# Patient Record
Sex: MALE | Race: WHITE | NOT HISPANIC OR LATINO | Employment: OTHER | URBAN - METROPOLITAN AREA
[De-identification: names, ages, dates, MRNs, and addresses within clinical notes are randomized per-mention and may not be internally consistent; named-entity substitution may affect disease eponyms.]

---

## 2023-05-13 ENCOUNTER — HOSPITAL ENCOUNTER (EMERGENCY)
Facility: CLINIC | Age: 78
Discharge: HOME | End: 2023-05-13
Attending: STUDENT IN AN ORGANIZED HEALTH CARE EDUCATION/TRAINING PROGRAM | Admitting: STUDENT IN AN ORGANIZED HEALTH CARE EDUCATION/TRAINING PROGRAM
Payer: MEDICARE

## 2023-05-13 ENCOUNTER — APPOINTMENT (OUTPATIENT)
Dept: CT IMAGING | Facility: CLINIC | Age: 78
End: 2023-05-13
Attending: STUDENT IN AN ORGANIZED HEALTH CARE EDUCATION/TRAINING PROGRAM
Payer: MEDICARE

## 2023-05-13 VITALS
RESPIRATION RATE: 18 BRPM | DIASTOLIC BLOOD PRESSURE: 82 MMHG | HEIGHT: 68 IN | HEART RATE: 69 BPM | TEMPERATURE: 98.1 F | OXYGEN SATURATION: 98 % | BODY MASS INDEX: 27.43 KG/M2 | WEIGHT: 181 LBS | SYSTOLIC BLOOD PRESSURE: 153 MMHG

## 2023-05-13 DIAGNOSIS — N20.0 KIDNEY STONE: ICD-10-CM

## 2023-05-13 PROBLEM — G47.31 CENTRAL SLEEP APNEA: Status: ACTIVE | Noted: 2022-10-24

## 2023-05-13 PROBLEM — N18.30 CKD (CHRONIC KIDNEY DISEASE) STAGE 3, GFR 30-59 ML/MIN (H): Status: ACTIVE | Noted: 2018-12-08

## 2023-05-13 PROBLEM — A41.9 SEPSIS (H): Status: ACTIVE | Noted: 2018-12-07

## 2023-05-13 PROBLEM — Z95.1 HISTORY OF CORONARY ARTERY BYPASS GRAFT: Status: ACTIVE | Noted: 2022-05-31

## 2023-05-13 LAB
ALBUMIN UR-MCNC: 50 MG/DL
ANION GAP SERPL CALCULATED.3IONS-SCNC: 10 MMOL/L (ref 5–18)
APPEARANCE UR: CLEAR
BACTERIA #/AREA URNS HPF: ABNORMAL /HPF
BASOPHILS # BLD AUTO: 0 10E3/UL (ref 0–0.2)
BASOPHILS NFR BLD AUTO: 0 %
BILIRUB UR QL STRIP: NEGATIVE
BUN SERPL-MCNC: 27 MG/DL (ref 8–28)
C REACTIVE PROTEIN LHE: 0.2 MG/DL (ref 0–?)
CALCIUM SERPL-MCNC: 9.7 MG/DL (ref 8.5–10.5)
CHLORIDE BLD-SCNC: 105 MMOL/L (ref 98–107)
CO2 SERPL-SCNC: 24 MMOL/L (ref 22–31)
COLOR UR AUTO: ABNORMAL
CREAT SERPL-MCNC: 1.49 MG/DL (ref 0.7–1.3)
EOSINOPHIL # BLD AUTO: 0.1 10E3/UL (ref 0–0.7)
EOSINOPHIL NFR BLD AUTO: 1 %
ERYTHROCYTE [DISTWIDTH] IN BLOOD BY AUTOMATED COUNT: 12.3 % (ref 10–15)
GFR SERPL CREATININE-BSD FRML MDRD: 47 ML/MIN/1.73M2
GLUCOSE BLD-MCNC: 226 MG/DL (ref 70–125)
GLUCOSE UR STRIP-MCNC: NEGATIVE MG/DL
HCT VFR BLD AUTO: 38.3 % (ref 40–53)
HGB BLD-MCNC: 12.6 G/DL (ref 13.3–17.7)
HGB UR QL STRIP: ABNORMAL
IMM GRANULOCYTES # BLD: 0.1 10E3/UL
IMM GRANULOCYTES NFR BLD: 0 %
KETONES UR STRIP-MCNC: NEGATIVE MG/DL
LEUKOCYTE ESTERASE UR QL STRIP: NEGATIVE
LYMPHOCYTES # BLD AUTO: 1.5 10E3/UL (ref 0.8–5.3)
LYMPHOCYTES NFR BLD AUTO: 11 %
MCH RBC QN AUTO: 31.3 PG (ref 26.5–33)
MCHC RBC AUTO-ENTMCNC: 32.9 G/DL (ref 31.5–36.5)
MCV RBC AUTO: 95 FL (ref 78–100)
MONOCYTES # BLD AUTO: 0.7 10E3/UL (ref 0–1.3)
MONOCYTES NFR BLD AUTO: 5 %
MUCOUS THREADS #/AREA URNS LPF: PRESENT /LPF
NEUTROPHILS # BLD AUTO: 11 10E3/UL (ref 1.6–8.3)
NEUTROPHILS NFR BLD AUTO: 83 %
NITRATE UR QL: NEGATIVE
NRBC # BLD AUTO: 0 10E3/UL
NRBC BLD AUTO-RTO: 0 /100
PH UR STRIP: 5.5 [PH] (ref 5–7)
PLATELET # BLD AUTO: 210 10E3/UL (ref 150–450)
POTASSIUM BLD-SCNC: 4.5 MMOL/L (ref 3.5–5)
RBC # BLD AUTO: 4.03 10E6/UL (ref 4.4–5.9)
RBC URINE: 25 /HPF
SODIUM SERPL-SCNC: 139 MMOL/L (ref 136–145)
SP GR UR STRIP: 1.02 (ref 1–1.03)
SQUAMOUS EPITHELIAL: 1 /HPF
UROBILINOGEN UR STRIP-MCNC: <2 MG/DL
WBC # BLD AUTO: 13.4 10E3/UL (ref 4–11)
WBC URINE: 4 /HPF

## 2023-05-13 PROCEDURE — 85004 AUTOMATED DIFF WBC COUNT: CPT | Performed by: EMERGENCY MEDICINE

## 2023-05-13 PROCEDURE — 96375 TX/PRO/DX INJ NEW DRUG ADDON: CPT

## 2023-05-13 PROCEDURE — 250N000011 HC RX IP 250 OP 636: Performed by: STUDENT IN AN ORGANIZED HEALTH CARE EDUCATION/TRAINING PROGRAM

## 2023-05-13 PROCEDURE — 36415 COLL VENOUS BLD VENIPUNCTURE: CPT | Performed by: EMERGENCY MEDICINE

## 2023-05-13 PROCEDURE — 80048 BASIC METABOLIC PNL TOTAL CA: CPT | Performed by: EMERGENCY MEDICINE

## 2023-05-13 PROCEDURE — G1010 CDSM STANSON: HCPCS

## 2023-05-13 PROCEDURE — 96374 THER/PROPH/DIAG INJ IV PUSH: CPT | Mod: 59

## 2023-05-13 PROCEDURE — 87086 URINE CULTURE/COLONY COUNT: CPT | Performed by: STUDENT IN AN ORGANIZED HEALTH CARE EDUCATION/TRAINING PROGRAM

## 2023-05-13 PROCEDURE — 99285 EMERGENCY DEPT VISIT HI MDM: CPT | Mod: 25

## 2023-05-13 PROCEDURE — 81003 URINALYSIS AUTO W/O SCOPE: CPT | Performed by: EMERGENCY MEDICINE

## 2023-05-13 PROCEDURE — 86140 C-REACTIVE PROTEIN: CPT | Performed by: EMERGENCY MEDICINE

## 2023-05-13 PROCEDURE — 250N000013 HC RX MED GY IP 250 OP 250 PS 637: Performed by: STUDENT IN AN ORGANIZED HEALTH CARE EDUCATION/TRAINING PROGRAM

## 2023-05-13 RX ORDER — IBUPROFEN 200 MG
400 TABLET ORAL EVERY 6 HOURS
Qty: 56 TABLET | Refills: 0 | Status: SHIPPED | OUTPATIENT
Start: 2023-05-13 | End: 2023-05-20

## 2023-05-13 RX ORDER — KETOROLAC TROMETHAMINE 15 MG/ML
15 INJECTION, SOLUTION INTRAMUSCULAR; INTRAVENOUS ONCE
Status: COMPLETED | OUTPATIENT
Start: 2023-05-13 | End: 2023-05-13

## 2023-05-13 RX ORDER — DIMENHYDRINATE 50 MG
50 TABLET ORAL EVERY 6 HOURS PRN
Qty: 28 TABLET | Refills: 0 | Status: SHIPPED | OUTPATIENT
Start: 2023-05-13 | End: 2023-05-20

## 2023-05-13 RX ORDER — DIMENHYDRINATE 50 MG
50 TABLET ORAL AT BEDTIME
Qty: 7 TABLET | Refills: 0 | Status: SHIPPED | OUTPATIENT
Start: 2023-05-13 | End: 2023-05-20

## 2023-05-13 RX ORDER — ONDANSETRON 2 MG/ML
4 INJECTION INTRAMUSCULAR; INTRAVENOUS ONCE
Status: COMPLETED | OUTPATIENT
Start: 2023-05-13 | End: 2023-05-13

## 2023-05-13 RX ORDER — OXYCODONE HYDROCHLORIDE 5 MG/1
5 TABLET ORAL EVERY 4 HOURS PRN
Qty: 6 TABLET | Refills: 0 | Status: SHIPPED | OUTPATIENT
Start: 2023-05-13 | End: 2023-05-17

## 2023-05-13 RX ORDER — IOPAMIDOL 755 MG/ML
75 INJECTION, SOLUTION INTRAVASCULAR ONCE
Status: COMPLETED | OUTPATIENT
Start: 2023-05-13 | End: 2023-05-13

## 2023-05-13 RX ORDER — CEPHALEXIN 500 MG/1
500 CAPSULE ORAL ONCE
Status: COMPLETED | OUTPATIENT
Start: 2023-05-13 | End: 2023-05-13

## 2023-05-13 RX ORDER — ACETAMINOPHEN 500 MG
1000 TABLET ORAL EVERY 6 HOURS
Qty: 56 TABLET | Refills: 0 | Status: SHIPPED | OUTPATIENT
Start: 2023-05-13 | End: 2023-05-20

## 2023-05-13 RX ORDER — CEPHALEXIN 500 MG/1
500 CAPSULE ORAL 4 TIMES DAILY
Qty: 28 CAPSULE | Refills: 0 | Status: SHIPPED | OUTPATIENT
Start: 2023-05-13 | End: 2023-05-20

## 2023-05-13 RX ADMIN — CEPHALEXIN 500 MG: 500 CAPSULE ORAL at 13:00

## 2023-05-13 RX ADMIN — ONDANSETRON 4 MG: 2 INJECTION INTRAMUSCULAR; INTRAVENOUS at 11:52

## 2023-05-13 RX ADMIN — KETOROLAC TROMETHAMINE 15 MG: 15 INJECTION, SOLUTION INTRAMUSCULAR; INTRAVENOUS at 11:53

## 2023-05-13 RX ADMIN — IOPAMIDOL 75 ML: 755 INJECTION, SOLUTION INTRAVENOUS at 11:47

## 2023-05-13 ASSESSMENT — ACTIVITIES OF DAILY LIVING (ADL): ADLS_ACUITY_SCORE: 35

## 2023-05-13 NOTE — ED PROVIDER NOTES
Emergency Department Encounter         FINAL IMPRESSION:    Kidney stone, bacturia      ED COURSE AND MEDICAL DECISION MAKING       ED Course as of 05/13/23 1323   Sat May 13, 2023   1203 Patient is a 78-year-old male history of kidney stones, here with left flank pain began last night.  2 episodes of vomiting.  Unknown whether he has hematuria.  No bowel changes.  No chest pain or trouble breathing.  On arrival his vitals are stable.  He looks well clinically.  States he has increasing left flank pain.  No rash.  Abdomen is otherwise benign.  Heart and lungs normal.  Plan for labs CT reevaluate.    Urinalysis has small amount of bacteria with no overt signs of infection.  Mild white count of 13.  CRP normal.  No fever.   1314 Labs showing mild leukocytosis.  CRP normal.  Unsure if this is patient's baseline creatinine.  Small amount of bacteria with no obvious signs of infection.  Patient sent with Keflex as a conservative measure due to the fact that he has had urine infections in the past.  He seems reliable, feels well now with no systemic symptoms including fevers or chills.  We explicitly discussed return precautions including rigors, nausea vomiting, night sweats, or increased fatigue to return immediately to the ER             11:36 AM I met with the patient to gather history and to perform my initial exam. We discussed plans for the ED course, including diagnostic testing and treatment.   12:42 PM I rechecked on the patient and updated them on any lab or radiology results.   12:55 PM I rechecked on the patient and updated them on any lab or radiology results. I discussed the plan for discharge. I discussed potential symptoms/reasons to return to the ED and all questions were answered. The patient is comfortable with the plan.    Medical Decision Making    History:    Supplemental history from: Documented in chart, if applicable    External Record(s) reviewed: Documented in chart, if applicable.    Work  Up:    Chart documentation includes differential considered and any EKGs or imaging independently interpreted by provider, where specified.    In additional to work up documented, I considered the following work up: Documented in chart, if applicable.    External consultation:    Discussion of management with another provider: Documented in chart, if applicable    Complicating factors:    Care impacted by chronic illness: N/A    Care affected by social determinants of health: N/A    Disposition considerations: Discharge. I prescribed additional prescription strength medication(s) as charted. I considered admission, but discharged patient after significant clinical improvement.                  Critical Care     Performed by: Maurice Chamberlain or    Authorized by: Maurice Chamberlain  Total critical care time:  minutes  Critical care was necessary to treat or prevent imminent or life-threatening deterioration of the following conditions:   Critical care was time spent personally by me on the following activities: development of treatment plan with patient or surrogate, discussions with consultants, examination of patient, evaluation of patient's response to treatment, obtaining history from patient or surrogate, ordering and performing treatments and interventions, ordering and review of laboratory studies, ordering and review of radiographic studies, re-evaluation of patient's condition and monitoring for potential decompensation.  Critical care time was exclusive of separately billable procedures and treating other patients.'    At the conclusion of the encounter I discussed the results of all the tests and the disposition. The questions were answered. The patient or family acknowledged understanding and was agreeable with the care plan.                  MEDICATIONS GIVEN IN THE EMERGENCY DEPARTMENT:  Medications   ketorolac (TORADOL) injection 15 mg (15 mg Intravenous $Given 5/13/23 1155)   ondansetron (ZOFRAN) injection 4 mg (4 mg  Intravenous $Given 5/13/23 1152)   iopamidol (ISOVUE-370) solution 75 mL (75 mLs Intravenous $Given 5/13/23 1147)   cephALEXin (KEFLEX) capsule 500 mg (500 mg Oral $Given 5/13/23 1300)       NEW PRESCRIPTIONS STARTED AT TODAY'S ED VISIT:  Discharge Medication List as of 5/13/2023 12:59 PM      START taking these medications    Details   acetaminophen (TYLENOL) 500 MG tablet Take 2 tablets (1,000 mg) by mouth every 6 hours for 7 days, Disp-56 tablet, R-0, Local Print      cephALEXin (KEFLEX) 500 MG capsule Take 1 capsule (500 mg) by mouth 4 times daily for 7 days, Disp-28 capsule, R-0, Local Print      !! dimenhyDRINATE (DRAMAMINE) 50 MG tablet Take 1 tablet (50 mg) by mouth At Bedtime for 7 days, Disp-7 tablet, R-0, Local Print      !! dimenhyDRINATE (DRAMAMINE) 50 MG tablet Take 1 tablet (50 mg) by mouth every 6 hours as needed for other (kidney stone pain management), Disp-28 tablet, R-0, Local Print      ibuprofen (ADVIL/MOTRIN) 200 MG tablet Take 2 tablets (400 mg) by mouth every 6 hours for 7 days, Disp-56 tablet, R-0, Local Print      oxyCODONE (ROXICODONE) 5 MG tablet Take 1 tablet (5 mg) by mouth every 4 hours as needed for severe pain If pain is not improved with acetaminophen and ibuprofen., Disp-6 tablet, R-0, Local Print       !! - Potential duplicate medications found. Please discuss with provider.          HPI     Patient information obtained from: patient    Use of Interpretor: N/A    Navin Teresa is a 78 year old male with a pertinent history of CKD stage 3, CAD involving CABG, hyperlipidemia, hypertension, NSTEMI, type 2 diabetes mellitus, kidney stones, sepsis, and malignant neoplasm of prostate who presents to this ED via walk in by self for evaluation of left-sided flank pain and vomiting.      Per chart review, patient presented to [facility]rology Clinic on 4/10/2023 for evaluation of lower urinary tract symptom follow up. Patient ran out of tamsulosin and noticed worsening of lower urinary tract  "symptoms, including increased urinary frequency and weaker stream. He ultimately received a courtesy refill and was back on tamsulosin 0.4 mg daily. Plan to continue this.   Per chart review, patient spoke with nurse triage on 5/13/2023 for evaluation of flank pain. Started feeling pain in left kidney all night long. Rated pain as a 5-6/10. Vomited this AM. Has a history of kidney stones.     Patient presents with left-sided flank pain that has been constantly present since last night (5/12). Also reports of 2 episodes of vomiting, once last night and once this morning. He reports he feels \"terrible.\" He feels like he has a kidney stone. He has had kidney stones in the past, but never this bad.     Patient denies chest pain, trouble breathing, fever, blood in urine, urinary issues, night sweats, or nausea. Patient does not report of any other medical concerns or complaints at this time.     Per triage note, patient had a UTI 3 weeks ago.    REVIEW OF SYSTEMS:  Review of Systems   Constitutional: Negative for fever, malaise, or night sweats  HEENT: Negative runny nose, sore throat, ear pain, neck pain  Respiratory: Negative for shortness of breath, cough, congestion  Cardiovascular: Negative for chest pain, leg edema  Gastrointestinal: Negative for abdominal distention, abdominal pain, constipation, nausea (currently), diarrhea. Positive for vomiting.  Genitourinary: Negative for dysuria and hematuria. Positive for left-sided flank pain.   Integument: Negative for rash, skin breakdown  Neurological: Negative for paresthesias, weakness, headache.  Musculoskeletal: Negative for joint pain, joint swelling    All other systems reviewed and are negative.    MEDICAL HISTORY     History reviewed. No pertinent past medical history.    History reviewed. No pertinent surgical history.         acetaminophen (TYLENOL) 500 MG tablet  cephALEXin (KEFLEX) 500 MG capsule  dimenhyDRINATE (DRAMAMINE) 50 MG tablet  dimenhyDRINATE " "(DRAMAMINE) 50 MG tablet  ibuprofen (ADVIL/MOTRIN) 200 MG tablet  oxyCODONE (ROXICODONE) 5 MG tablet            PHYSICAL EXAM     BP (!) 153/82   Pulse 69   Temp 98.1  F (36.7  C) (Temporal)   Resp 18   Ht 1.727 m (5' 8\")   Wt 82.1 kg (181 lb)   SpO2 98%   BMI 27.52 kg/m        PHYSICAL EXAM:     General: Patient appears well, nontoxic, comfortable  HEENT: Moist mucous membranes,  No head trauma.    Cardiovascular: Normal rate, normal rhythm, no extremity edema.  No appreciable murmur.  Respiratory: No signs of respiratory distress, lungs are clear to auscultation bilaterally with no wheezes rhonchi or rales.  Abdominal: Soft, nontender, nondistended, no palpable masses, no guarding, no rebound  Musculoskeletal: Full range of motion of joints, no deformities appreciated.  Neurological: Alert and oriented, grossly neurologically intact.  Psychological: Normal affect and mood.  Integument: No rashes appreciated          RESULTS       Labs Ordered and Resulted from Time of ED Arrival to Time of ED Departure   BASIC METABOLIC PANEL - Abnormal       Result Value    Sodium 139      Potassium 4.5      Chloride 105      Carbon Dioxide (CO2) 24      Anion Gap 10      Urea Nitrogen 27      Creatinine 1.49 (*)     Calcium 9.7      Glucose 226 (*)     GFR Estimate 47 (*)    ROUTINE UA WITH MICROSCOPIC REFLEX TO CULTURE - Abnormal    Color Urine Light Yellow      Appearance Urine Clear      Glucose Urine Negative      Bilirubin Urine Negative      Ketones Urine Negative      Specific Gravity Urine 1.024      Blood Urine 0.2 mg/dL (*)     pH Urine 5.5      Protein Albumin Urine 50 (*)     Urobilinogen Urine <2.0      Nitrite Urine Negative      Leukocyte Esterase Urine Negative      Bacteria Urine Few (*)     Mucus Urine Present (*)     RBC Urine 25 (*)     WBC Urine 4      Squamous Epithelials Urine 1     CBC WITH PLATELETS AND DIFFERENTIAL - Abnormal    WBC Count 13.4 (*)     RBC Count 4.03 (*)     Hemoglobin 12.6 (*)  "    Hematocrit 38.3 (*)     MCV 95      MCH 31.3      MCHC 32.9      RDW 12.3      Platelet Count 210      % Neutrophils 83      % Lymphocytes 11      % Monocytes 5      % Eosinophils 1      % Basophils 0      % Immature Granulocytes 0      NRBCs per 100 WBC 0      Absolute Neutrophils 11.0 (*)     Absolute Lymphocytes 1.5      Absolute Monocytes 0.7      Absolute Eosinophils 0.1      Absolute Basophils 0.0      Absolute Immature Granulocytes 0.1      Absolute NRBCs 0.0     CRP INFLAMMATION - Normal    CRP 0.2     URINE CULTURE       CT Abdomen Pelvis w Contrast   Final Result   IMPRESSION:    Obstructing 0.4 cm calculus at the left ureterovesical junction.                        PROCEDURES:  Procedures:  Procedures       I, Tam Loza am serving as a scribe to document services personally performed by Maurice Chamberlain DO, based on my observations and the provider's statements to me.  I, Maurice Chamberlain DO, attest that Tam Loza is acting in a scribe capacity, has observed my performance of the services and has documented them in accordance with my direction.    Maurice Chamberlain DO  Emergency Medicine  [facility] EMERGENCY ROOM     Maurice Chamberlain DO  05/13/23 1400

## 2023-05-13 NOTE — ED TRIAGE NOTES
Patient has left flank pain since last night. 7/10. UTI 3 weeks ago. Hx. Of kidney stones. Nausea and vomiting.      Triage Assessment     Row Name 05/13/23 1101       Triage Assessment (Adult)    Airway WDL WDL       Respiratory WDL    Respiratory WDL X  SOB with exertion       Skin Circulation/Temperature WDL    Skin Circulation/Temperature WDL WDL       Cardiac WDL    Cardiac WDL WDL       Peripheral/Neurovascular WDL    Peripheral Neurovascular WDL WDL       Cognitive/Neuro/Behavioral WDL    Cognitive/Neuro/Behavioral WDL WDL

## 2023-05-13 NOTE — Clinical Note
Navin Teresa was seen and treated in our emergency department on 5/13/2023.  He may return to work on 05/17/2023.       If you have any questions or concerns, please don't hesitate to call.      Ohl, Maurice Schwartz, DO

## 2023-05-15 LAB — BACTERIA UR CULT: NORMAL

## 2024-02-01 ENCOUNTER — HOSPITAL ENCOUNTER (EMERGENCY)
Facility: CLINIC | Age: 79
Discharge: HOME | DRG: 853 | End: 2024-02-01
Attending: EMERGENCY MEDICINE | Admitting: EMERGENCY MEDICINE
Payer: MEDICARE

## 2024-02-01 ENCOUNTER — APPOINTMENT (OUTPATIENT)
Dept: CT IMAGING | Facility: CLINIC | Age: 79
DRG: 853 | End: 2024-02-01
Attending: EMERGENCY MEDICINE
Payer: MEDICARE

## 2024-02-01 VITALS
RESPIRATION RATE: 16 BRPM | HEART RATE: 59 BPM | SYSTOLIC BLOOD PRESSURE: 134 MMHG | DIASTOLIC BLOOD PRESSURE: 60 MMHG | WEIGHT: 161 LBS | OXYGEN SATURATION: 96 % | BODY MASS INDEX: 24.48 KG/M2 | TEMPERATURE: 98 F

## 2024-02-01 DIAGNOSIS — N20.1 URETEROLITHIASIS: ICD-10-CM

## 2024-02-01 LAB
ALBUMIN UR-MCNC: 50 MG/DL
ANION GAP SERPL CALCULATED.3IONS-SCNC: 9 MMOL/L (ref 7–15)
APPEARANCE UR: CLEAR
BILIRUB UR QL STRIP: NEGATIVE
BUN SERPL-MCNC: 31.3 MG/DL (ref 8–23)
CALCIUM SERPL-MCNC: 10.2 MG/DL (ref 8.8–10.2)
CHLORIDE SERPL-SCNC: 106 MMOL/L (ref 98–107)
COLOR UR AUTO: ABNORMAL
CREAT SERPL-MCNC: 1.46 MG/DL (ref 0.67–1.17)
DEPRECATED HCO3 PLAS-SCNC: 23 MMOL/L (ref 22–29)
EGFRCR SERPLBLD CKD-EPI 2021: 49 ML/MIN/1.73M2
ERYTHROCYTE [DISTWIDTH] IN BLOOD BY AUTOMATED COUNT: 13 % (ref 10–15)
GLUCOSE SERPL-MCNC: 277 MG/DL (ref 70–99)
GLUCOSE UR STRIP-MCNC: NEGATIVE MG/DL
HCT VFR BLD AUTO: 36.6 % (ref 40–53)
HGB BLD-MCNC: 11.8 G/DL (ref 13.3–17.7)
HGB UR QL STRIP: ABNORMAL
KETONES UR STRIP-MCNC: NEGATIVE MG/DL
LEUKOCYTE ESTERASE UR QL STRIP: NEGATIVE
MCH RBC QN AUTO: 31.4 PG (ref 26.5–33)
MCHC RBC AUTO-ENTMCNC: 32.2 G/DL (ref 31.5–36.5)
MCV RBC AUTO: 97 FL (ref 78–100)
MUCOUS THREADS #/AREA URNS LPF: PRESENT /LPF
NITRATE UR QL: NEGATIVE
PH UR STRIP: 5.5 [PH] (ref 5–7)
PLATELET # BLD AUTO: 152 10E3/UL (ref 150–450)
POTASSIUM SERPL-SCNC: 4.7 MMOL/L (ref 3.4–5.3)
RBC # BLD AUTO: 3.76 10E6/UL (ref 4.4–5.9)
RBC URINE: 64 /HPF
SODIUM SERPL-SCNC: 138 MMOL/L (ref 135–145)
SP GR UR STRIP: 1.02 (ref 1–1.03)
SQUAMOUS EPITHELIAL: 2 /HPF
UROBILINOGEN UR STRIP-MCNC: <2 MG/DL
WBC # BLD AUTO: 10.2 10E3/UL (ref 4–11)
WBC URINE: 5 /HPF

## 2024-02-01 PROCEDURE — 80048 BASIC METABOLIC PNL TOTAL CA: CPT | Performed by: EMERGENCY MEDICINE

## 2024-02-01 PROCEDURE — 258N000003 HC RX IP 258 OP 636: Performed by: EMERGENCY MEDICINE

## 2024-02-01 PROCEDURE — 74176 CT ABD & PELVIS W/O CONTRAST: CPT | Mod: MG

## 2024-02-01 PROCEDURE — 81001 URINALYSIS AUTO W/SCOPE: CPT | Performed by: EMERGENCY MEDICINE

## 2024-02-01 PROCEDURE — 99285 EMERGENCY DEPT VISIT HI MDM: CPT | Mod: 25

## 2024-02-01 PROCEDURE — 96361 HYDRATE IV INFUSION ADD-ON: CPT

## 2024-02-01 PROCEDURE — 250N000011 HC RX IP 250 OP 636: Performed by: EMERGENCY MEDICINE

## 2024-02-01 PROCEDURE — 96374 THER/PROPH/DIAG INJ IV PUSH: CPT

## 2024-02-01 PROCEDURE — 85027 COMPLETE CBC AUTOMATED: CPT | Performed by: EMERGENCY MEDICINE

## 2024-02-01 PROCEDURE — 36415 COLL VENOUS BLD VENIPUNCTURE: CPT | Performed by: EMERGENCY MEDICINE

## 2024-02-01 RX ORDER — ACETAMINOPHEN 500 MG
1000 TABLET ORAL EVERY 6 HOURS
Qty: 56 TABLET | Refills: 0 | Status: ON HOLD | OUTPATIENT
Start: 2024-02-01 | End: 2024-02-06

## 2024-02-01 RX ORDER — KETOROLAC TROMETHAMINE 15 MG/ML
15 INJECTION, SOLUTION INTRAMUSCULAR; INTRAVENOUS ONCE
Status: COMPLETED | OUTPATIENT
Start: 2024-02-01 | End: 2024-02-01

## 2024-02-01 RX ORDER — OXYCODONE HYDROCHLORIDE 5 MG/1
5 TABLET ORAL EVERY 4 HOURS PRN
Qty: 12 TABLET | Refills: 0 | Status: ON HOLD | OUTPATIENT
Start: 2024-02-01 | End: 2024-02-06

## 2024-02-01 RX ORDER — DIMENHYDRINATE 50 MG
50 TABLET ORAL EVERY 6 HOURS PRN
Qty: 28 TABLET | Refills: 0 | Status: ON HOLD | OUTPATIENT
Start: 2024-02-01 | End: 2024-02-06

## 2024-02-01 RX ORDER — ONDANSETRON 4 MG/1
4 TABLET, ORALLY DISINTEGRATING ORAL EVERY 6 HOURS PRN
Qty: 12 TABLET | Refills: 0 | Status: ON HOLD | OUTPATIENT
Start: 2024-02-01 | End: 2024-02-06

## 2024-02-01 RX ADMIN — KETOROLAC TROMETHAMINE 15 MG: 15 INJECTION, SOLUTION INTRAMUSCULAR; INTRAVENOUS at 15:50

## 2024-02-01 RX ADMIN — SODIUM CHLORIDE 500 ML: 9 INJECTION, SOLUTION INTRAVENOUS at 15:54

## 2024-02-01 ASSESSMENT — ACTIVITIES OF DAILY LIVING (ADL): ADLS_ACUITY_SCORE: 35

## 2024-02-01 NOTE — ED PROVIDER NOTES
EMERGENCY DEPARTMENT ENCOUNTER      NAME: Navin Teresa  AGE: 78 year old male  YOB: 1945  MRN: [id number]  EVALUATION DATE & TIME: No admission date for patient encounter.    PCP: Paolo Joyce    ED PROVIDER: Navin Diamond D.O.      Chief Complaint   Patient presents with    Flank Pain       FINAL IMPRESSION:  1. Ureterolithiasis        ED COURSE & MEDICAL DECISION MAKING:    3:47 PM I met with the patient to gather history and to perform my initial exam. I discussed the plan for care while in the Emergency Department.  5:38 PM Spoke to Dr. Hernandez, from [facility]. Recommend patient go home. Patient will receive call tomorrow for a virtual visit for an evaluation and follow up. Likely intervention next week.  5:41 PM Rechecked and updated patient on lab and imaging results and [facility] recommendations.       Pertinent Labs & Imaging studies reviewed. (See chart for details)  78 year old male presents to the Emergency Department for evaluation of left flank pain.  Initial differential does include urolithiasis, pyelonephritis, diverticulitis, colitis, musculoskeletal etiology, other acute process.  Lab testing did show significant blood in his urine, but no evidence of infection making pyelonephritis unlikely.  CT imaging does not show any evidence of inflammation of the bowel, but does show obvious left-sided urolithiasis, 8 mm in size.  I consulted with [facility] due to the severity of the size, and as his pain is well-controlled, they did recommend outpatient follow-up in their clinic.  Patient was comfortable with this plan.  He will be discharged symptomatic control.  Return precautions were discussed.    Medical Decision Making  Obtained supplemental history:Supplemental history obtained?: Documented in chart  Reviewed external records: External records reviewed?: Documented in chart  Care impacted by chronic illness:Cancer/Chemotherapy, Chronic Kidney Disease, Diabetes, Heart Disease, Hyperlipidemia, and  Hypertension  Care significantly affected by social determinants of health:N/A  Did you consider but not order tests?: Work up considered but not performed and documented in chart, if applicable  Did you interpret images independently?: Independent interpretation of ECG and images noted in documentation, when applicable.  Consultation discussion with other provider:Did you involve another provider (consultant, , pharmacy, etc.)?: I discussed the care with another health care provider, see documentation for details.  Discharge. I prescribed additional prescription strength medication(s) as charted. I considered admission, but discharged patient after significant clinical improvement.    At the conclusion of the encounter I discussed the results of all of the tests and the disposition. The questions were answered. The patient or family acknowledged understanding and was agreeable with the care plan.        HPI    Patient information was obtained from: Patient    Use of : N/A       Navin Teresa is a 78 year old male with PMHx of kidney stones, DM, stage 3 CKD, prostate cancer, CAD, dyslipidemia, HLD, and HTN, who presents with left flank pain.    Patient reports 6 months ago, he had left flank pain, was evaluated, and was found to have a 4 mm kidney stone at the end of his ureter. Mentions previous stone was able to pass on its own, without surgical intervention. He states that the last time he was seen at the ER, he was given Toradol and Zofran with relief. Notes he is not nauseous now. Denies dysuria, right flank pain, hematuria, cough, congestion, runny nose, sore throat, and other URI-like symptoms.     He has a history of cardiac issues, diabetes, kidney stones, and high cholesterol. No allergies to medications. Reports occasional alcohol use, but denies smoking. No other reported complaints or concerns at this time.      REVIEW OF SYSTEMS  Constitutional:  Denies fever, chills, weight loss or  weakness.  Eyes:  No pain, discharge, redness  HENT:  Denies sore throat, ear pain, congestion  Respiratory: No SOB, wheeze or cough  Cardiovascular:  No CP, palpitations  GI:  Denies abdominal pain, nausea, vomiting, diarrhea  : Denies dysuria, hematuria  Musculoskeletal:  Reports left flank pain, Denies right flank pain, and any new muscle/joint pain, swelling or loss of function.  Skin:  Denies rash, pallor  Neurologic:  Denies headache, focal weakness or sensory changes  Lymph: Denies swollen nodes    All other systems negative unless noted in HPI.    PAST MEDICAL HISTORY:  No past medical history on file.    PAST SURGICAL HISTORY:  No past surgical history on file.      CURRENT MEDICATIONS:    No current facility-administered medications for this encounter.     Current Outpatient Medications   Medication    acetaminophen (TYLENOL) 500 MG tablet    dimenhyDRINATE (DRAMAMINE) 50 MG tablet    ondansetron (ZOFRAN ODT) 4 MG ODT tab    oxyCODONE (ROXICODONE) 5 MG tablet         ALLERGIES:  No Known Allergies    FAMILY HISTORY:  No family history on file.    SOCIAL HISTORY:  Social History     Socioeconomic History    Marital status:        VITALS:  Patient Vitals for the past 24 hrs:   BP Temp Temp src Pulse Resp SpO2 Weight   02/01/24 1735 (!) 150/67 -- -- 64 -- 98 % --   02/01/24 1539 139/64 98  F (36.7  C) Oral 64 16 99 % 73 kg (161 lb)       PHYSICAL EXAM    VITAL SIGNS: BP (!) 150/67   Pulse 64   Temp 98  F (36.7  C) (Oral)   Resp 16   Wt 73 kg (161 lb)   SpO2 98%   BMI 24.48 kg/m      General Appearance: Well-appearing, well-nourished, no acute distress.  Head:  Normocephalic, without obvious abnormality, atraumatic  Eyes:  PERRL, conjunctiva/corneas clear, EOM's intact,  ENT:  Lips, mucosa, and tongue normal, membranes are moist without pallor  Neck:  Normal ROM, symmetrical, trachea midline    Cardio:  Regular rate and rhythm, no murmur, rub or gallop, 2+ pulses symmetric in all  extremities  Pulm:  Clear to auscultation bilaterally, respirations unlabored,  Back:  ROM normal, no CVA tenderness, no spinal tenderness, no paraspinal tenderness  Abdomen:  Soft, non-tender, no rebound or guarding.  Musculoskeletal: Full ROM, no edema, no cyanosis, good ROM of major joints  Integument:  Warm, Dry, No erythema, No rash.    Neurologic:  Alert & oriented.  No focal deficits appreciated.  Ambulatory.  Psychiatric:  Affect normal, Judgment normal, Mood normal.      LABS  Results for orders placed or performed during the hospital encounter of 02/01/24 (from the past 24 hour(s))   CBC (+ platelets, no diff)   Result Value Ref Range    WBC Count 10.2 4.0 - 11.0 10e3/uL    RBC Count 3.76 (L) 4.40 - 5.90 10e6/uL    Hemoglobin 11.8 (L) 13.3 - 17.7 g/dL    Hematocrit 36.6 (L) 40.0 - 53.0 %    MCV 97 78 - 100 fL    MCH 31.4 26.5 - 33.0 pg    MCHC 32.2 31.5 - 36.5 g/dL    RDW 13.0 10.0 - 15.0 %    Platelet Count 152 150 - 450 10e3/uL   Basic metabolic panel   Result Value Ref Range    Sodium 138 135 - 145 mmol/L    Potassium 4.7 3.4 - 5.3 mmol/L    Chloride 106 98 - 107 mmol/L    Carbon Dioxide (CO2) 23 22 - 29 mmol/L    Anion Gap 9 7 - 15 mmol/L    Urea Nitrogen 31.3 (H) 8.0 - 23.0 mg/dL    Creatinine 1.46 (H) 0.67 - 1.17 mg/dL    GFR Estimate 49 (L) >60 mL/min/1.73m2    Calcium 10.2 8.8 - 10.2 mg/dL    Glucose 277 (H) 70 - 99 mg/dL   UA with Microscopic reflex to Culture    Specimen: Urine, Clean Catch   Result Value Ref Range    Color Urine Light Yellow Colorless, Straw, Light Yellow, Yellow    Appearance Urine Clear Clear    Glucose Urine Negative Negative mg/dL    Bilirubin Urine Negative Negative    Ketones Urine Negative Negative mg/dL    Specific Gravity Urine 1.021 1.001 - 1.030    Blood Urine 1.0 mg/dL (A) Negative    pH Urine 5.5 5.0 - 7.0    Protein Albumin Urine 50 (A) Negative mg/dL    Urobilinogen Urine <2.0 <2.0 mg/dL    Nitrite Urine Negative Negative    Leukocyte Esterase Urine Negative  Negative    Mucus Urine Present (A) None Seen /LPF    RBC Urine 64 (H) <=2 /HPF    WBC Urine 5 <=5 /HPF    Squamous Epithelials Urine 2 (H) <=1 /HPF    Narrative    Urine Culture not indicated   Abd/pelvis CT no contrast - Stone Protocol    Narrative    EXAM: CT ABDOMEN PELVIS W/O CONTRAST  LOCATION: [facility]  DATE: 2/1/2024    INDICATION: Left flank pain  COMPARISON: CT 05/13/2023  TECHNIQUE: CT scan of the abdomen and pelvis was performed without IV contrast. Multiplanar reformats were obtained. Dose reduction techniques were used.  CONTRAST: None.    FINDINGS:   LOWER CHEST: Sternotomy. Coronary artery calcifications and apical pericardial calcifications.    HEPATOBILIARY: Cholelithiasis. Normal unenhanced liver and bile ducts.    PANCREAS: Normal.    SPLEEN: Stable atrophy.    ADRENAL GLANDS: Normal.    KIDNEYS/BLADDER: Obstructing 8 x 5 x 7 mm left ureteral pelvic junction stone with mild left-sided hydronephrosis and asymmetric perinephric edema. There is also a 2 mm calculus at the left ureteropelvic junction. Right mid renal parenchyma   calcifications and bilateral renal vascular calcifications. Multiple bilateral renal cysts including a right mid renal cyst with layering milk of calcium. A subcentimeter hyperdensity in the left mid renal cortex is likely a hemorrhagic cyst.  No   follow-up is indicated.     BOWEL: Normal small bowel and appendix. Mild colonic stool burden. Distal colonic diverticulosis without evidence of acute diverticulitis. No free air or free fluid.    LYMPH NODES: Normal.    VASCULATURE: Severe aortoiliac atherosclerosis.    PELVIC ORGANS: Prostatic calcifications.    MUSCULOSKELETAL: Spinal and pelvic degenerative changes.      Impression    IMPRESSION:   1.  Obstructing 8 mm left ureteropelvic junction stone with mild left-sided hydronephrosis. A 2 mm left ureteral pelvic junction stone is also noted.           RADIOLOGY  Abd/pelvis CT no contrast - Stone  Protocol   Final Result   IMPRESSION:    1.  Obstructing 8 mm left ureteropelvic junction stone with mild left-sided hydronephrosis. A 2 mm left ureteral pelvic junction stone is also noted.                  MEDICATIONS GIVEN IN THE EMERGENCY:  Medications   ketorolac (TORADOL) injection 15 mg (15 mg Intravenous $Given 2/1/24 1550)   sodium chloride 0.9% BOLUS 500 mL (0 mLs Intravenous Stopped 2/1/24 1738)       NEW PRESCRIPTIONS STARTED AT TODAY'S ER VISIT  New Prescriptions    ACETAMINOPHEN (TYLENOL) 500 MG TABLET    Take 2 tablets (1,000 mg) by mouth every 6 hours for 7 days    DIMENHYDRINATE (DRAMAMINE) 50 MG TABLET    Take 1 tablet (50 mg) by mouth every 6 hours as needed for other (kidney stone pain management)    ONDANSETRON (ZOFRAN ODT) 4 MG ODT TAB    Take 1 tablet (4 mg) by mouth every 6 hours as needed for nausea    OXYCODONE (ROXICODONE) 5 MG TABLET    Take 1 tablet (5 mg) by mouth every 4 hours as needed for severe pain If pain is not improved with acetaminophen and ibuprofen.        I, Traci Rodriguez, am serving as a scribe to document services personally performed by Navin Diamond D.O., based on my observations and the provider's statements to me.  I, Navin Diamond D.O., attest that Traic Rodriguez is acting in a scribe capacity, has observed my performance of the services and has documented them in accordance with my direction.     Navin Diamond D.O.  Emergency Medicine  [facility] EMERGENCY ROOM  [address] [facility] [id number] [phone]  Dept: [phone]       Navin Diamond,   02/01/24 2056

## 2024-02-01 NOTE — PROGRESS NOTES
Remote Note    CC: left ureter stone    HPI:  79 yo male with history of stone disease presenting with 8 mm left proximal stone p  Pain controlled  No fever    Labs:  Recent Labs   Lab 02/01/24  1547   WBC 10.2   HGB 11.8*          Recent Labs   Lab 02/01/24  1547   POTASSIUM 4.7   CR 1.46*   *   ORTEGA 10.2     UA 64 RBC and 5 WBC    Imaging  IMPRESSION:   1.  Obstructing 8 mm left ureteropelvic junction stone with mild left-sided hydronephrosis. A 2 mm left ureteral pelvic junction stone is also noted.      A/P:  79 yo with 8 mm left ureter stone  UA not concern for infection  Pain controlled  Cr elevated but baseline  Will add on for urology clinic visit to discuss, given size will likely need intervention but will discuss with him at visit.     The above treatment consideration and suggestions are based on discussion with the consulting provider. I have not personally examined the patient. All recommendations should be implemented with consideration of the patients relevant prior history and current clinical status. Please feel free to contact me with any questions about this patient's care    MD Uday Contreras MD  Urology

## 2024-02-01 NOTE — ED TRIAGE NOTES
Left flank pain.  Has hx of stones.       Triage Assessment (Adult)       Row Name 02/01/24 1538          Triage Assessment    Airway WDL WDL        Respiratory WDL    Respiratory WDL WDL        Skin Circulation/Temperature WDL    Skin Circulation/Temperature WDL WDL        Cardiac WDL    Cardiac WDL WDL        Peripheral/Neurovascular WDL    Peripheral Neurovascular WDL WDL        Cognitive/Neuro/Behavioral WDL    Cognitive/Neuro/Behavioral WDL WDL

## 2024-02-02 NOTE — ED NOTES
Reviewed discharge instructions with pt and family member. Instructed pt on strainer. Answered all questions.

## 2024-02-03 ENCOUNTER — NURSE TRIAGE (OUTPATIENT)
Dept: NURSING | Facility: CLINIC | Age: 79
End: 2024-02-03
Payer: MEDICARE

## 2024-02-03 ENCOUNTER — HOSPITAL ENCOUNTER (INPATIENT)
Facility: CLINIC | Age: 79
LOS: 2 days | Discharge: HOME | DRG: 853 | End: 2024-02-06
Attending: EMERGENCY MEDICINE | Admitting: HOSPITALIST
Payer: MEDICARE

## 2024-02-03 DIAGNOSIS — N12 PYELONEPHRITIS: Primary | ICD-10-CM

## 2024-02-03 DIAGNOSIS — R33.9 URINARY RETENTION: ICD-10-CM

## 2024-02-03 DIAGNOSIS — N20.0 KIDNEY STONE: ICD-10-CM

## 2024-02-03 DIAGNOSIS — A41.9 SEPSIS, DUE TO UNSPECIFIED ORGANISM, UNSPECIFIED WHETHER ACUTE ORGAN DYSFUNCTION PRESENT (H): ICD-10-CM

## 2024-02-03 DIAGNOSIS — N17.9 AKI (ACUTE KIDNEY INJURY) (H): ICD-10-CM

## 2024-02-03 DIAGNOSIS — R10.9 LEFT FLANK PAIN: ICD-10-CM

## 2024-02-03 LAB
BASOPHILS # BLD AUTO: 0 10E3/UL (ref 0–0.2)
BASOPHILS NFR BLD AUTO: 0 %
EOSINOPHIL # BLD AUTO: 0 10E3/UL (ref 0–0.7)
EOSINOPHIL NFR BLD AUTO: 0 %
ERYTHROCYTE [DISTWIDTH] IN BLOOD BY AUTOMATED COUNT: 12.8 % (ref 10–15)
HCT VFR BLD AUTO: 32.9 % (ref 40–53)
HGB BLD-MCNC: 10.7 G/DL (ref 13.3–17.7)
IMM GRANULOCYTES # BLD: 0.1 10E3/UL
IMM GRANULOCYTES NFR BLD: 1 %
LACTATE SERPL-SCNC: 1.1 MMOL/L (ref 0.7–2)
LYMPHOCYTES # BLD AUTO: 0.9 10E3/UL (ref 0.8–5.3)
LYMPHOCYTES NFR BLD AUTO: 7 %
MCH RBC QN AUTO: 32.2 PG (ref 26.5–33)
MCHC RBC AUTO-ENTMCNC: 32.5 G/DL (ref 31.5–36.5)
MCV RBC AUTO: 99 FL (ref 78–100)
MONOCYTES # BLD AUTO: 1.4 10E3/UL (ref 0–1.3)
MONOCYTES NFR BLD AUTO: 11 %
NEUTROPHILS # BLD AUTO: 10.1 10E3/UL (ref 1.6–8.3)
NEUTROPHILS NFR BLD AUTO: 81 %
NRBC # BLD AUTO: 0 10E3/UL
NRBC BLD AUTO-RTO: 0 /100
PLATELET # BLD AUTO: 166 10E3/UL (ref 150–450)
RBC # BLD AUTO: 3.32 10E6/UL (ref 4.4–5.9)
WBC # BLD AUTO: 12.5 10E3/UL (ref 4–11)

## 2024-02-03 PROCEDURE — 85379 FIBRIN DEGRADATION QUANT: CPT | Performed by: EMERGENCY MEDICINE

## 2024-02-03 PROCEDURE — 87637 SARSCOV2&INF A&B&RSV AMP PRB: CPT | Performed by: EMERGENCY MEDICINE

## 2024-02-03 PROCEDURE — 83880 ASSAY OF NATRIURETIC PEPTIDE: CPT | Performed by: EMERGENCY MEDICINE

## 2024-02-03 PROCEDURE — 87040 BLOOD CULTURE FOR BACTERIA: CPT | Performed by: EMERGENCY MEDICINE

## 2024-02-03 PROCEDURE — 84145 PROCALCITONIN (PCT): CPT | Performed by: EMERGENCY MEDICINE

## 2024-02-03 PROCEDURE — 83036 HEMOGLOBIN GLYCOSYLATED A1C: CPT | Performed by: HOSPITALIST

## 2024-02-03 PROCEDURE — 85025 COMPLETE CBC W/AUTO DIFF WBC: CPT | Performed by: EMERGENCY MEDICINE

## 2024-02-03 PROCEDURE — 83605 ASSAY OF LACTIC ACID: CPT | Performed by: EMERGENCY MEDICINE

## 2024-02-03 PROCEDURE — 82040 ASSAY OF SERUM ALBUMIN: CPT | Performed by: EMERGENCY MEDICINE

## 2024-02-03 PROCEDURE — 36415 COLL VENOUS BLD VENIPUNCTURE: CPT | Performed by: EMERGENCY MEDICINE

## 2024-02-03 PROCEDURE — 99285 EMERGENCY DEPT VISIT HI MDM: CPT | Mod: 25

## 2024-02-03 RX ORDER — ACETAMINOPHEN 325 MG/1
975 TABLET ORAL ONCE
Status: COMPLETED | OUTPATIENT
Start: 2024-02-04 | End: 2024-02-04

## 2024-02-03 RX ORDER — PIPERACILLIN SODIUM, TAZOBACTAM SODIUM 3; .375 G/15ML; G/15ML
3.38 INJECTION, POWDER, LYOPHILIZED, FOR SOLUTION INTRAVENOUS ONCE
Status: COMPLETED | OUTPATIENT
Start: 2024-02-03 | End: 2024-02-04

## 2024-02-03 ASSESSMENT — ENCOUNTER SYMPTOMS
FLANK PAIN: 1
FEVER: 1
COUGH: 0
WEAKNESS: 1
DYSURIA: 0

## 2024-02-03 NOTE — TELEPHONE ENCOUNTER
Niece is the caller who is with pt, however pt is pretty sedated and sleeping.  Pt gives verbal consent for niece to talk on pt behalf.  Pt seen in ED 2/1/24 and dx kidney stone.  Pain is having pretty severe pain and pain med did not help and niece states he took Tylenol and fell back asleep.  Pt has taken 2000 mg Tylenol within 5 hrs and has taken Oxycodone.  Niece is scared as to how much he took of Tylenol.   Reviewed medication administration for pain management with niece and reviewed what to return to ED for per   Mom will follow Care Advice and follow up with clinic if anything needed further.  Jayshree Huggins RN  [facility] Nurse Advisor    Reason for Disposition    History of kidney stones    Additional Information    Negative: Passed out (i.e., lost consciousness, collapsed and was not responding)    Negative: Shock suspected (e.g., cold/pale/clammy skin, too weak to stand, low BP, rapid pulse)    Negative: Difficult to awaken or acting confused (e.g., disoriented, slurred speech)    Negative: Sounds like a life-threatening emergency to the triager    Negative: Followed a major injury to the back (e.g., MVA, fall > 10 feet or 3 meters, penetrating injury, etc.)    Negative: Back pain or flank pain during pregnancy    Negative: Upper, mid or lower back pain that occurs mainly in the midline    Negative: [1] SEVERE pain (e.g., excruciating, scale 8-10) AND [2] present > 1 hour    Negative: [1] SEVERE pain (e.g., excruciating, scale 8-10) AND [2] not improved after pain medicine    Negative: [1] Sudden onset of severe flank pain AND [2] age > 60 years    Negative: [1] Abdominal pain AND [2] age > 60 years    Negative: [1] Unable to urinate (or only a few drops) > 4 hours AND [2] bladder feels very full (e.g., palpable bladder or strong urge to urinate)    Negative: Vomiting    Negative: Weakness of a leg or foot (e.g., unable to bear weight, dragging foot)    Negative: Patient sounds very sick or weak to the  "triager    Negative: Fever > 100.4 F (38.0 C)    Negative: Pain or burning with passing urine (urination)    Negative: MODERATE pain (e.g., interferes with normal activities or awakens from sleep)    Negative: Pain radiates into groin, scrotum    Negative: Blood in urine (red, pink, or tea-colored)    Negative: Pregnant  (Exception: Mild pain that is only present with movement.)    Negative: Diabetes mellitus or weak immune system (e.g., HIV positive, cancer chemo, splenectomy, organ transplant, chronic steroids)  (Exception: Mild pain that is only present with movement.)    Negative: Rash in same area as pain (may be described as \"small blisters\")    Negative: [1] MILD pain (i.e., scale 1-3; does not interfere with normal activities) AND [2] present > 3 days    Protocols used: Flank Pain-A-AH    "

## 2024-02-04 ENCOUNTER — APPOINTMENT (OUTPATIENT)
Dept: CT IMAGING | Facility: CLINIC | Age: 79
DRG: 853 | End: 2024-02-04
Attending: EMERGENCY MEDICINE
Payer: MEDICARE

## 2024-02-04 ENCOUNTER — APPOINTMENT (OUTPATIENT)
Dept: CARDIOLOGY | Facility: CLINIC | Age: 79
DRG: 853 | End: 2024-02-04
Attending: EMERGENCY MEDICINE
Payer: MEDICARE

## 2024-02-04 ENCOUNTER — ANESTHESIA EVENT (OUTPATIENT)
Dept: SURGERY | Facility: CLINIC | Age: 79
DRG: 853 | End: 2024-02-04
Payer: MEDICARE

## 2024-02-04 ENCOUNTER — APPOINTMENT (OUTPATIENT)
Dept: RADIOLOGY | Facility: CLINIC | Age: 79
DRG: 853 | End: 2024-02-04
Attending: EMERGENCY MEDICINE
Payer: MEDICARE

## 2024-02-04 ENCOUNTER — APPOINTMENT (OUTPATIENT)
Dept: RADIOLOGY | Facility: CLINIC | Age: 79
DRG: 853 | End: 2024-02-04
Attending: STUDENT IN AN ORGANIZED HEALTH CARE EDUCATION/TRAINING PROGRAM
Payer: MEDICARE

## 2024-02-04 ENCOUNTER — ANESTHESIA (OUTPATIENT)
Dept: SURGERY | Facility: CLINIC | Age: 79
DRG: 853 | End: 2024-02-04
Payer: MEDICARE

## 2024-02-04 PROBLEM — N20.0 KIDNEY STONE: Status: ACTIVE | Noted: 2024-02-04

## 2024-02-04 PROBLEM — N12 PYELONEPHRITIS: Status: ACTIVE | Noted: 2024-02-04

## 2024-02-04 PROBLEM — R10.9 LEFT FLANK PAIN: Status: ACTIVE | Noted: 2024-02-04

## 2024-02-04 PROBLEM — N17.9 AKI (ACUTE KIDNEY INJURY) (H): Status: ACTIVE | Noted: 2024-02-04

## 2024-02-04 PROBLEM — A41.9 SEPSIS, DUE TO UNSPECIFIED ORGANISM, UNSPECIFIED WHETHER ACUTE ORGAN DYSFUNCTION PRESENT (H): Status: ACTIVE | Noted: 2024-02-04

## 2024-02-04 LAB
ALBUMIN SERPL BCG-MCNC: 3.5 G/DL (ref 3.5–5.2)
ALBUMIN UR-MCNC: 30 MG/DL
ALP SERPL-CCNC: 25 U/L (ref 40–150)
ALT SERPL W P-5'-P-CCNC: 26 U/L (ref 0–70)
ANION GAP SERPL CALCULATED.3IONS-SCNC: 10 MMOL/L (ref 7–15)
ANION GAP SERPL CALCULATED.3IONS-SCNC: 8 MMOL/L (ref 7–15)
APPEARANCE UR: CLEAR
AST SERPL W P-5'-P-CCNC: 18 U/L (ref 0–45)
BACTERIA #/AREA URNS HPF: ABNORMAL /HPF
BASOPHILS # BLD AUTO: 0 10E3/UL (ref 0–0.2)
BASOPHILS NFR BLD AUTO: 0 %
BILIRUB SERPL-MCNC: 1.1 MG/DL
BILIRUB UR QL STRIP: NEGATIVE
BUN SERPL-MCNC: 41.3 MG/DL (ref 8–23)
BUN SERPL-MCNC: 44.3 MG/DL (ref 8–23)
CALCIUM SERPL-MCNC: 8.7 MG/DL (ref 8.8–10.2)
CALCIUM SERPL-MCNC: 8.8 MG/DL (ref 8.8–10.2)
CHLORIDE SERPL-SCNC: 107 MMOL/L (ref 98–107)
CHLORIDE SERPL-SCNC: 108 MMOL/L (ref 98–107)
COLOR UR AUTO: ABNORMAL
CREAT SERPL-MCNC: 2.76 MG/DL (ref 0.67–1.17)
CREAT SERPL-MCNC: 2.85 MG/DL (ref 0.67–1.17)
D DIMER PPP FEU-MCNC: 0.5 UG/ML FEU (ref 0–0.5)
DEPRECATED HCO3 PLAS-SCNC: 21 MMOL/L (ref 22–29)
DEPRECATED HCO3 PLAS-SCNC: 22 MMOL/L (ref 22–29)
EGFRCR SERPLBLD CKD-EPI 2021: 22 ML/MIN/1.73M2
EGFRCR SERPLBLD CKD-EPI 2021: 23 ML/MIN/1.73M2
EOSINOPHIL # BLD AUTO: 0.1 10E3/UL (ref 0–0.7)
EOSINOPHIL NFR BLD AUTO: 1 %
ERYTHROCYTE [DISTWIDTH] IN BLOOD BY AUTOMATED COUNT: 12.8 % (ref 10–15)
FLUAV RNA SPEC QL NAA+PROBE: NEGATIVE
FLUBV RNA RESP QL NAA+PROBE: NEGATIVE
GLUCOSE BLDC GLUCOMTR-MCNC: 133 MG/DL (ref 70–99)
GLUCOSE BLDC GLUCOMTR-MCNC: 140 MG/DL (ref 70–99)
GLUCOSE BLDC GLUCOMTR-MCNC: 144 MG/DL (ref 70–99)
GLUCOSE BLDC GLUCOMTR-MCNC: 149 MG/DL (ref 70–99)
GLUCOSE BLDC GLUCOMTR-MCNC: 149 MG/DL (ref 70–99)
GLUCOSE BLDC GLUCOMTR-MCNC: 166 MG/DL (ref 70–99)
GLUCOSE BLDC GLUCOMTR-MCNC: 191 MG/DL (ref 70–99)
GLUCOSE SERPL-MCNC: 152 MG/DL (ref 70–99)
GLUCOSE SERPL-MCNC: 178 MG/DL (ref 70–99)
GLUCOSE UR STRIP-MCNC: NEGATIVE MG/DL
HBA1C MFR BLD: 7.6 %
HCT VFR BLD AUTO: 32.8 % (ref 40–53)
HGB BLD-MCNC: 10.3 G/DL (ref 13.3–17.7)
HGB UR QL STRIP: ABNORMAL
IMM GRANULOCYTES # BLD: 0.1 10E3/UL
IMM GRANULOCYTES NFR BLD: 1 %
KETONES UR STRIP-MCNC: NEGATIVE MG/DL
LACTATE SERPL-SCNC: 0.9 MMOL/L (ref 0.7–2)
LEUKOCYTE ESTERASE UR QL STRIP: NEGATIVE
LYMPHOCYTES # BLD AUTO: 0.9 10E3/UL (ref 0.8–5.3)
LYMPHOCYTES NFR BLD AUTO: 9 %
MCH RBC QN AUTO: 31.5 PG (ref 26.5–33)
MCHC RBC AUTO-ENTMCNC: 31.4 G/DL (ref 31.5–36.5)
MCV RBC AUTO: 100 FL (ref 78–100)
MONOCYTES # BLD AUTO: 1.1 10E3/UL (ref 0–1.3)
MONOCYTES NFR BLD AUTO: 10 %
MUCOUS THREADS #/AREA URNS LPF: PRESENT /LPF
NEUTROPHILS # BLD AUTO: 8.6 10E3/UL (ref 1.6–8.3)
NEUTROPHILS NFR BLD AUTO: 79 %
NITRATE UR QL: NEGATIVE
NRBC # BLD AUTO: 0 10E3/UL
NRBC BLD AUTO-RTO: 0 /100
NT-PROBNP SERPL-MCNC: 3238 PG/ML (ref 0–1800)
PH UR STRIP: 5.5 [PH] (ref 5–7)
PLATELET # BLD AUTO: 147 10E3/UL (ref 150–450)
POTASSIUM SERPL-SCNC: 4.8 MMOL/L (ref 3.4–5.3)
POTASSIUM SERPL-SCNC: 4.9 MMOL/L (ref 3.4–5.3)
PROCALCITONIN SERPL IA-MCNC: 0.4 NG/ML
PROT SERPL-MCNC: 6.4 G/DL (ref 6.4–8.3)
RBC # BLD AUTO: 3.27 10E6/UL (ref 4.4–5.9)
RBC URINE: 19 /HPF
RSV RNA SPEC NAA+PROBE: NEGATIVE
SARS-COV-2 RNA RESP QL NAA+PROBE: NEGATIVE
SODIUM SERPL-SCNC: 137 MMOL/L (ref 135–145)
SODIUM SERPL-SCNC: 139 MMOL/L (ref 135–145)
SP GR UR STRIP: 1.02 (ref 1–1.03)
UROBILINOGEN UR STRIP-MCNC: <2 MG/DL
WBC # BLD AUTO: 10.8 10E3/UL (ref 4–11)
WBC URINE: 6 /HPF

## 2024-02-04 PROCEDURE — 36415 COLL VENOUS BLD VENIPUNCTURE: CPT | Performed by: EMERGENCY MEDICINE

## 2024-02-04 PROCEDURE — 250N000011 HC RX IP 250 OP 636: Performed by: HOSPITALIST

## 2024-02-04 PROCEDURE — 250N000009 HC RX 250: Performed by: NURSE ANESTHETIST, CERTIFIED REGISTERED

## 2024-02-04 PROCEDURE — 255N000002 HC RX 255 OP 636: Performed by: STUDENT IN AN ORGANIZED HEALTH CARE EDUCATION/TRAINING PROGRAM

## 2024-02-04 PROCEDURE — 74420 UROGRAPHY RTRGR +-KUB: CPT | Mod: 26 | Performed by: STUDENT IN AN ORGANIZED HEALTH CARE EDUCATION/TRAINING PROGRAM

## 2024-02-04 PROCEDURE — 360N000082 HC SURGERY LEVEL 2 W/ FLUORO, PER MIN: Performed by: STUDENT IN AN ORGANIZED HEALTH CARE EDUCATION/TRAINING PROGRAM

## 2024-02-04 PROCEDURE — 74176 CT ABD & PELVIS W/O CONTRAST: CPT | Mod: MG

## 2024-02-04 PROCEDURE — 250N000011 HC RX IP 250 OP 636: Performed by: EMERGENCY MEDICINE

## 2024-02-04 PROCEDURE — 0T778DZ DILATION OF LEFT URETER WITH INTRALUMINAL DEVICE, VIA NATURAL OR ARTIFICIAL OPENING ENDOSCOPIC: ICD-10-PCS | Performed by: STUDENT IN AN ORGANIZED HEALTH CARE EDUCATION/TRAINING PROGRAM

## 2024-02-04 PROCEDURE — 258N000001 HC RX 258: Performed by: STUDENT IN AN ORGANIZED HEALTH CARE EDUCATION/TRAINING PROGRAM

## 2024-02-04 PROCEDURE — 93306 TTE W/DOPPLER COMPLETE: CPT | Mod: 26 | Performed by: INTERNAL MEDICINE

## 2024-02-04 PROCEDURE — 99207 PR APP CREDIT; MD BILLING SHARED VISIT: CPT | Performed by: EMERGENCY MEDICINE

## 2024-02-04 PROCEDURE — 258N000003 HC RX IP 258 OP 636: Performed by: HOSPITALIST

## 2024-02-04 PROCEDURE — 96365 THER/PROPH/DIAG IV INF INIT: CPT

## 2024-02-04 PROCEDURE — 93306 TTE W/DOPPLER COMPLETE: CPT

## 2024-02-04 PROCEDURE — 250N000012 HC RX MED GY IP 250 OP 636 PS 637: Performed by: HOSPITALIST

## 2024-02-04 PROCEDURE — 272N000001 HC OR GENERAL SUPPLY STERILE: Performed by: STUDENT IN AN ORGANIZED HEALTH CARE EDUCATION/TRAINING PROGRAM

## 2024-02-04 PROCEDURE — 71045 X-RAY EXAM CHEST 1 VIEW: CPT

## 2024-02-04 PROCEDURE — 258N000003 HC RX IP 258 OP 636: Performed by: EMERGENCY MEDICINE

## 2024-02-04 PROCEDURE — 999N000182 XR SURGERY CARM FLUORO GREATER THAN 5 MIN: Mod: TC

## 2024-02-04 PROCEDURE — 250N000013 HC RX MED GY IP 250 OP 250 PS 637: Performed by: EMERGENCY MEDICINE

## 2024-02-04 PROCEDURE — 80048 BASIC METABOLIC PNL TOTAL CA: CPT | Performed by: HOSPITALIST

## 2024-02-04 PROCEDURE — 82962 GLUCOSE BLOOD TEST: CPT

## 2024-02-04 PROCEDURE — 81001 URINALYSIS AUTO W/SCOPE: CPT | Performed by: EMERGENCY MEDICINE

## 2024-02-04 PROCEDURE — 96361 HYDRATE IV INFUSION ADD-ON: CPT

## 2024-02-04 PROCEDURE — 83605 ASSAY OF LACTIC ACID: CPT | Performed by: EMERGENCY MEDICINE

## 2024-02-04 PROCEDURE — 85025 COMPLETE CBC W/AUTO DIFF WBC: CPT | Performed by: HOSPITALIST

## 2024-02-04 PROCEDURE — 370N000017 HC ANESTHESIA TECHNICAL FEE, PER MIN: Performed by: STUDENT IN AN ORGANIZED HEALTH CARE EDUCATION/TRAINING PROGRAM

## 2024-02-04 PROCEDURE — 87086 URINE CULTURE/COLONY COUNT: CPT | Performed by: EMERGENCY MEDICINE

## 2024-02-04 PROCEDURE — 99223 1ST HOSP IP/OBS HIGH 75: CPT | Mod: AI | Performed by: HOSPITALIST

## 2024-02-04 PROCEDURE — 36415 COLL VENOUS BLD VENIPUNCTURE: CPT | Performed by: HOSPITALIST

## 2024-02-04 PROCEDURE — C2617 STENT, NON-COR, TEM W/O DEL: HCPCS | Performed by: STUDENT IN AN ORGANIZED HEALTH CARE EDUCATION/TRAINING PROGRAM

## 2024-02-04 PROCEDURE — 87040 BLOOD CULTURE FOR BACTERIA: CPT | Performed by: EMERGENCY MEDICINE

## 2024-02-04 PROCEDURE — C1769 GUIDE WIRE: HCPCS | Performed by: STUDENT IN AN ORGANIZED HEALTH CARE EDUCATION/TRAINING PROGRAM

## 2024-02-04 PROCEDURE — 52332 CYSTOSCOPY AND TREATMENT: CPT | Mod: LT | Performed by: STUDENT IN AN ORGANIZED HEALTH CARE EDUCATION/TRAINING PROGRAM

## 2024-02-04 PROCEDURE — 250N000011 HC RX IP 250 OP 636: Performed by: NURSE ANESTHETIST, CERTIFIED REGISTERED

## 2024-02-04 PROCEDURE — 120N000001 HC R&B MED SURG/OB

## 2024-02-04 DEVICE — URETERAL STENT
Type: IMPLANTABLE DEVICE | Site: URETER | Status: NON-FUNCTIONAL
Brand: PERCUFLEX™ PLUS
Removed: 2024-02-19

## 2024-02-04 RX ORDER — FENTANYL CITRATE 50 UG/ML
25 INJECTION, SOLUTION INTRAMUSCULAR; INTRAVENOUS EVERY 5 MIN PRN
Status: CANCELLED | OUTPATIENT
Start: 2024-02-04

## 2024-02-04 RX ORDER — CALCIUM CARBONATE 500 MG/1
1000 TABLET, CHEWABLE ORAL 4 TIMES DAILY PRN
Status: DISCONTINUED | OUTPATIENT
Start: 2024-02-04 | End: 2024-02-06 | Stop reason: HOSPADM

## 2024-02-04 RX ORDER — FAMOTIDINE 20 MG/1
20 TABLET, FILM COATED ORAL 2 TIMES DAILY
Status: DISCONTINUED | OUTPATIENT
Start: 2024-02-04 | End: 2024-02-04

## 2024-02-04 RX ORDER — ONDANSETRON 2 MG/ML
4 INJECTION INTRAMUSCULAR; INTRAVENOUS EVERY 6 HOURS PRN
Status: DISCONTINUED | OUTPATIENT
Start: 2024-02-04 | End: 2024-02-06 | Stop reason: HOSPADM

## 2024-02-04 RX ORDER — METOPROLOL SUCCINATE 100 MG/1
100 TABLET, EXTENDED RELEASE ORAL EVERY EVENING
Status: DISCONTINUED | OUTPATIENT
Start: 2024-02-04 | End: 2024-02-06 | Stop reason: HOSPADM

## 2024-02-04 RX ORDER — NALOXONE HYDROCHLORIDE 0.4 MG/ML
0.4 INJECTION, SOLUTION INTRAMUSCULAR; INTRAVENOUS; SUBCUTANEOUS
Status: DISCONTINUED | OUTPATIENT
Start: 2024-02-04 | End: 2024-02-06 | Stop reason: HOSPADM

## 2024-02-04 RX ORDER — LIDOCAINE 40 MG/G
CREAM TOPICAL
Status: CANCELLED | OUTPATIENT
Start: 2024-02-04

## 2024-02-04 RX ORDER — METOPROLOL SUCCINATE 200 MG/1
200 TABLET, EXTENDED RELEASE ORAL EVERY EVENING
COMMUNITY

## 2024-02-04 RX ORDER — HYDROMORPHONE HCL IN WATER/PF 6 MG/30 ML
0.2 PATIENT CONTROLLED ANALGESIA SYRINGE INTRAVENOUS EVERY 5 MIN PRN
Status: CANCELLED | OUTPATIENT
Start: 2024-02-04

## 2024-02-04 RX ORDER — ONDANSETRON 4 MG/1
4 TABLET, ORALLY DISINTEGRATING ORAL EVERY 30 MIN PRN
Status: CANCELLED | OUTPATIENT
Start: 2024-02-04

## 2024-02-04 RX ORDER — GUAIFENESIN/DEXTROMETHORPHAN 100-10MG/5
10 SYRUP ORAL EVERY 4 HOURS PRN
Status: DISCONTINUED | OUTPATIENT
Start: 2024-02-04 | End: 2024-02-06 | Stop reason: HOSPADM

## 2024-02-04 RX ORDER — FAMOTIDINE 20 MG/1
20 TABLET, FILM COATED ORAL
Status: DISCONTINUED | OUTPATIENT
Start: 2024-02-04 | End: 2024-02-06

## 2024-02-04 RX ORDER — NICOTINE POLACRILEX 4 MG
15-30 LOZENGE BUCCAL
Status: DISCONTINUED | OUTPATIENT
Start: 2024-02-04 | End: 2024-02-06 | Stop reason: HOSPADM

## 2024-02-04 RX ORDER — PROCHLORPERAZINE 25 MG
12.5 SUPPOSITORY, RECTAL RECTAL EVERY 12 HOURS PRN
Status: DISCONTINUED | OUTPATIENT
Start: 2024-02-04 | End: 2024-02-06 | Stop reason: HOSPADM

## 2024-02-04 RX ORDER — ISOSORBIDE MONONITRATE 20 MG/1
20 TABLET ORAL EVERY EVENING
COMMUNITY

## 2024-02-04 RX ORDER — LOSARTAN POTASSIUM AND HYDROCHLOROTHIAZIDE 12.5; 1 MG/1; MG/1
1 TABLET ORAL EVERY EVENING
Status: ON HOLD | COMMUNITY
End: 2024-02-06

## 2024-02-04 RX ORDER — LIDOCAINE HYDROCHLORIDE 10 MG/ML
INJECTION, SOLUTION INFILTRATION; PERINEURAL PRN
Status: DISCONTINUED | OUTPATIENT
Start: 2024-02-04 | End: 2024-02-04

## 2024-02-04 RX ORDER — FENTANYL CITRATE 50 UG/ML
50 INJECTION, SOLUTION INTRAMUSCULAR; INTRAVENOUS EVERY 5 MIN PRN
Status: CANCELLED | OUTPATIENT
Start: 2024-02-04

## 2024-02-04 RX ORDER — PROCHLORPERAZINE MALEATE 5 MG
5 TABLET ORAL EVERY 6 HOURS PRN
Status: DISCONTINUED | OUTPATIENT
Start: 2024-02-04 | End: 2024-02-06 | Stop reason: HOSPADM

## 2024-02-04 RX ORDER — PROPOFOL 10 MG/ML
INJECTION, EMULSION INTRAVENOUS PRN
Status: DISCONTINUED | OUTPATIENT
Start: 2024-02-04 | End: 2024-02-04

## 2024-02-04 RX ORDER — FERROUS SULFATE 325(65) MG
325 TABLET ORAL EVERY EVENING
COMMUNITY

## 2024-02-04 RX ORDER — DOCUSATE SODIUM 100 MG/1
100 CAPSULE, LIQUID FILLED ORAL 2 TIMES DAILY PRN
Status: DISCONTINUED | OUTPATIENT
Start: 2024-02-04 | End: 2024-02-06 | Stop reason: HOSPADM

## 2024-02-04 RX ORDER — METOPROLOL SUCCINATE 100 MG/1
200 TABLET, EXTENDED RELEASE ORAL EVERY EVENING
Status: DISCONTINUED | OUTPATIENT
Start: 2024-02-04 | End: 2024-02-04

## 2024-02-04 RX ORDER — ONDANSETRON 2 MG/ML
4 INJECTION INTRAMUSCULAR; INTRAVENOUS EVERY 30 MIN PRN
Status: CANCELLED | OUTPATIENT
Start: 2024-02-04

## 2024-02-04 RX ORDER — FAMOTIDINE 20 MG/1
20 TABLET, FILM COATED ORAL 2 TIMES DAILY
COMMUNITY

## 2024-02-04 RX ORDER — AMOXICILLIN 250 MG
2 CAPSULE ORAL 2 TIMES DAILY PRN
Status: DISCONTINUED | OUTPATIENT
Start: 2024-02-04 | End: 2024-02-06 | Stop reason: HOSPADM

## 2024-02-04 RX ORDER — ONDANSETRON 4 MG/1
4 TABLET, ORALLY DISINTEGRATING ORAL EVERY 6 HOURS PRN
Status: DISCONTINUED | OUTPATIENT
Start: 2024-02-04 | End: 2024-02-06 | Stop reason: HOSPADM

## 2024-02-04 RX ORDER — SODIUM CHLORIDE 9 MG/ML
INJECTION, SOLUTION INTRAVENOUS CONTINUOUS
Status: DISCONTINUED | OUTPATIENT
Start: 2024-02-04 | End: 2024-02-05

## 2024-02-04 RX ORDER — NITROGLYCERIN 0.4 MG/1
0.4 TABLET SUBLINGUAL EVERY 5 MIN PRN
COMMUNITY

## 2024-02-04 RX ORDER — NALOXONE HYDROCHLORIDE 0.4 MG/ML
0.2 INJECTION, SOLUTION INTRAMUSCULAR; INTRAVENOUS; SUBCUTANEOUS
Status: DISCONTINUED | OUTPATIENT
Start: 2024-02-04 | End: 2024-02-06 | Stop reason: HOSPADM

## 2024-02-04 RX ORDER — ISOSORBIDE MONONITRATE 20 MG/1
20 TABLET ORAL EVERY EVENING
Status: DISCONTINUED | OUTPATIENT
Start: 2024-02-04 | End: 2024-02-06 | Stop reason: HOSPADM

## 2024-02-04 RX ORDER — ENOXAPARIN SODIUM 100 MG/ML
30 INJECTION SUBCUTANEOUS EVERY 24 HOURS
Status: DISCONTINUED | OUTPATIENT
Start: 2024-02-04 | End: 2024-02-06

## 2024-02-04 RX ORDER — OXYCODONE HYDROCHLORIDE 5 MG/1
10 TABLET ORAL
Status: CANCELLED | OUTPATIENT
Start: 2024-02-04

## 2024-02-04 RX ORDER — OXYCODONE HYDROCHLORIDE 5 MG/1
5 TABLET ORAL
Status: CANCELLED | OUTPATIENT
Start: 2024-02-04

## 2024-02-04 RX ORDER — PIPERACILLIN SODIUM, TAZOBACTAM SODIUM 3; .375 G/15ML; G/15ML
3.38 INJECTION, POWDER, LYOPHILIZED, FOR SOLUTION INTRAVENOUS EVERY 8 HOURS
Status: DISCONTINUED | OUTPATIENT
Start: 2024-02-04 | End: 2024-02-06 | Stop reason: HOSPADM

## 2024-02-04 RX ORDER — METFORMIN HCL 500 MG
1000 TABLET, EXTENDED RELEASE 24 HR ORAL 2 TIMES DAILY WITH MEALS
COMMUNITY

## 2024-02-04 RX ORDER — PROPOFOL 10 MG/ML
INJECTION, EMULSION INTRAVENOUS CONTINUOUS PRN
Status: DISCONTINUED | OUTPATIENT
Start: 2024-02-04 | End: 2024-02-04

## 2024-02-04 RX ORDER — FAMOTIDINE 20 MG/1
20 TABLET, FILM COATED ORAL DAILY
Status: DISCONTINUED | OUTPATIENT
Start: 2024-02-04 | End: 2024-02-04

## 2024-02-04 RX ORDER — ROSUVASTATIN CALCIUM 10 MG/1
40 TABLET, COATED ORAL EVERY EVENING
Status: DISCONTINUED | OUTPATIENT
Start: 2024-02-04 | End: 2024-02-06 | Stop reason: HOSPADM

## 2024-02-04 RX ORDER — ROSUVASTATIN CALCIUM 40 MG/1
40 TABLET, COATED ORAL EVERY EVENING
COMMUNITY

## 2024-02-04 RX ORDER — HYDROMORPHONE HCL IN WATER/PF 6 MG/30 ML
0.4 PATIENT CONTROLLED ANALGESIA SYRINGE INTRAVENOUS
Status: DISCONTINUED | OUTPATIENT
Start: 2024-02-04 | End: 2024-02-06 | Stop reason: HOSPADM

## 2024-02-04 RX ORDER — HYDROMORPHONE HCL IN WATER/PF 6 MG/30 ML
0.2 PATIENT CONTROLLED ANALGESIA SYRINGE INTRAVENOUS
Status: DISCONTINUED | OUTPATIENT
Start: 2024-02-04 | End: 2024-02-06 | Stop reason: HOSPADM

## 2024-02-04 RX ORDER — DEXTROSE MONOHYDRATE 25 G/50ML
25-50 INJECTION, SOLUTION INTRAVENOUS
Status: DISCONTINUED | OUTPATIENT
Start: 2024-02-04 | End: 2024-02-06 | Stop reason: HOSPADM

## 2024-02-04 RX ORDER — TAMSULOSIN HYDROCHLORIDE 0.4 MG/1
0.4 CAPSULE ORAL EVERY EVENING
COMMUNITY

## 2024-02-04 RX ORDER — HYDROMORPHONE HCL IN WATER/PF 6 MG/30 ML
0.4 PATIENT CONTROLLED ANALGESIA SYRINGE INTRAVENOUS EVERY 5 MIN PRN
Status: CANCELLED | OUTPATIENT
Start: 2024-02-04

## 2024-02-04 RX ORDER — AMLODIPINE BESYLATE 10 MG/1
10 TABLET ORAL EVERY EVENING
COMMUNITY

## 2024-02-04 RX ORDER — SODIUM CHLORIDE, SODIUM LACTATE, POTASSIUM CHLORIDE, CALCIUM CHLORIDE 600; 310; 30; 20 MG/100ML; MG/100ML; MG/100ML; MG/100ML
INJECTION, SOLUTION INTRAVENOUS CONTINUOUS
Status: CANCELLED | OUTPATIENT
Start: 2024-02-04

## 2024-02-04 RX ORDER — AMLODIPINE BESYLATE 10 MG/1
10 TABLET ORAL EVERY EVENING
Status: DISCONTINUED | OUTPATIENT
Start: 2024-02-04 | End: 2024-02-06 | Stop reason: HOSPADM

## 2024-02-04 RX ORDER — TAMSULOSIN HYDROCHLORIDE 0.4 MG/1
0.4 CAPSULE ORAL EVERY EVENING
Status: DISCONTINUED | OUTPATIENT
Start: 2024-02-04 | End: 2024-02-06 | Stop reason: HOSPADM

## 2024-02-04 RX ORDER — EZETIMIBE 10 MG/1
10 TABLET ORAL EVERY EVENING
COMMUNITY

## 2024-02-04 RX ORDER — AMOXICILLIN 250 MG
1 CAPSULE ORAL 2 TIMES DAILY PRN
Status: DISCONTINUED | OUTPATIENT
Start: 2024-02-04 | End: 2024-02-06 | Stop reason: HOSPADM

## 2024-02-04 RX ORDER — LIDOCAINE 40 MG/G
CREAM TOPICAL
Status: DISCONTINUED | OUTPATIENT
Start: 2024-02-04 | End: 2024-02-06 | Stop reason: HOSPADM

## 2024-02-04 RX ORDER — KETAMINE HYDROCHLORIDE 10 MG/ML
INJECTION INTRAMUSCULAR; INTRAVENOUS PRN
Status: DISCONTINUED | OUTPATIENT
Start: 2024-02-04 | End: 2024-02-04

## 2024-02-04 RX ADMIN — SODIUM CHLORIDE 1000 ML: 9 INJECTION, SOLUTION INTRAVENOUS at 02:36

## 2024-02-04 RX ADMIN — KETAMINE HYDROCHLORIDE 15 MG: 10 INJECTION INTRAMUSCULAR; INTRAVENOUS at 11:44

## 2024-02-04 RX ADMIN — METOPROLOL SUCCINATE 100 MG: 100 TABLET, EXTENDED RELEASE ORAL at 21:43

## 2024-02-04 RX ADMIN — AMLODIPINE BESYLATE 10 MG: 10 TABLET ORAL at 21:43

## 2024-02-04 RX ADMIN — SODIUM CHLORIDE 500 ML: 9 INJECTION, SOLUTION INTRAVENOUS at 01:14

## 2024-02-04 RX ADMIN — PROPOFOL 30 MG: 10 INJECTION, EMULSION INTRAVENOUS at 11:32

## 2024-02-04 RX ADMIN — PROPOFOL 200 MCG/KG/MIN: 10 INJECTION, EMULSION INTRAVENOUS at 11:28

## 2024-02-04 RX ADMIN — SODIUM CHLORIDE: 9 INJECTION, SOLUTION INTRAVENOUS at 01:57

## 2024-02-04 RX ADMIN — VANCOMYCIN HYDROCHLORIDE 1500 MG: 5 INJECTION, POWDER, LYOPHILIZED, FOR SOLUTION INTRAVENOUS at 14:35

## 2024-02-04 RX ADMIN — FAMOTIDINE 20 MG: 20 TABLET ORAL at 14:35

## 2024-02-04 RX ADMIN — PIPERACILLIN AND TAZOBACTAM 3.38 G: 3; .375 INJECTION, POWDER, FOR SOLUTION INTRAVENOUS at 06:49

## 2024-02-04 RX ADMIN — PIPERACILLIN AND TAZOBACTAM 3.38 G: 3; .375 INJECTION, POWDER, FOR SOLUTION INTRAVENOUS at 00:05

## 2024-02-04 RX ADMIN — TAMSULOSIN HYDROCHLORIDE 0.4 MG: 0.4 CAPSULE ORAL at 21:43

## 2024-02-04 RX ADMIN — ACETAMINOPHEN 975 MG: 325 TABLET ORAL at 00:10

## 2024-02-04 RX ADMIN — SODIUM CHLORIDE: 9 INJECTION, SOLUTION INTRAVENOUS at 19:21

## 2024-02-04 RX ADMIN — ISOSORBIDE MONONITRATE 20 MG: 20 TABLET ORAL at 21:43

## 2024-02-04 RX ADMIN — ENOXAPARIN SODIUM 30 MG: 30 INJECTION SUBCUTANEOUS at 19:46

## 2024-02-04 RX ADMIN — INSULIN ASPART 1 UNITS: 100 INJECTION, SOLUTION INTRAVENOUS; SUBCUTANEOUS at 02:55

## 2024-02-04 RX ADMIN — PROPOFOL 30 MG: 10 INJECTION, EMULSION INTRAVENOUS at 11:41

## 2024-02-04 RX ADMIN — PIPERACILLIN AND TAZOBACTAM 3.38 G: 3; .375 INJECTION, POWDER, FOR SOLUTION INTRAVENOUS at 14:35

## 2024-02-04 RX ADMIN — PIPERACILLIN AND TAZOBACTAM 3.38 G: 3; .375 INJECTION, POWDER, FOR SOLUTION INTRAVENOUS at 21:45

## 2024-02-04 RX ADMIN — LIDOCAINE HYDROCHLORIDE 5 ML: 10 INJECTION, SOLUTION INFILTRATION; PERINEURAL at 11:28

## 2024-02-04 ASSESSMENT — ACTIVITIES OF DAILY LIVING (ADL)
ADLS_ACUITY_SCORE: 24
ADLS_ACUITY_SCORE: 35
ADLS_ACUITY_SCORE: 35
ADLS_ACUITY_SCORE: 29
ADLS_ACUITY_SCORE: 24

## 2024-02-04 NOTE — TELEPHONE ENCOUNTER
"Permission given to speak to melvin Alex who is caring for him while he is in the hospital.   He has an 8mm kidney stone: Tylenol every 6 hrs, Oxy every 4 hrs and dramamine for ureteral spasms every 6 hrs. and ondansetron prn nausea and vomiting. He was told to return if fever, or pain becomes unmanageable . It is now 100.4 orally. He has had Tylenol already. OK with pain management now, but he overmedicated himself a few hours ago. \"He is on track now\".He is sleeping except for meals and bathroom.   [facility] telehealth appointment on Tuesday  Trying to get appointment with PCP Dr Joyce for Monday  [facility]. [facility] Care Line [phone].   See AVS instructions :\"call Dr or be seen immediately if...fever,...\"  Recommended to call [facility] Care Line now and speak to oncall provider. Melvin agrees with this plan.     Francine Simeon RN Triage Nurse Advisor 10:07 PM 2/3/2024      "

## 2024-02-04 NOTE — H&P
[facility] MEDICINE ADMISSION HISTORY AND PHYSICAL     Brief Synopsis:     Navin Teresa is a 78 year old male who presented with complaints of left flank pain.    Medical history is notable for known left 8mm ureteral stone, htn, CKD, coronary disease, sleep apnea.     Initial evaluation revealed fever, UA appearing infected, creat double his baseline, leukocytosis, hgb of 10.7, neg covid/flu/rsv. CXR with borderline cardiomegaly, mild venous congestion. CT abdomen with 0.7cm stone in prox mid left ureter, mod hydro, perinephric stranding.    Initial treatment included tylenol, zosyn, 500ml saline.    Assessment and Plan:  Sepsis due to pyelonephritis, ureteral stone with hydronephrosis  Continue zosyn  Urology consulted in ED  Ordered another 1L bolus of saline  Keep NPO  Pain meds, antiemetics prn  Follow Ucx  Previous cultures with e coli, enterococcus    ROMAN  CKD  Monitor with fluids  Hold nephrotoxic meds    Acute on chronic anemia  Monitor for GI blood loss  Repeat hgb in am    Essential htn  CAD, hx of cabg  LISE  NIDDM    Med rec is pending.    Clinically Significant Risk Factors Present on Admission                 # Acute Kidney Injury, unspecified: based on a >150% or 0.3 mg/dL increase in last creatinine compared to past 90 day average, will monitor renal function  # Hypertension: Noted on problem list                   Disposition Plan      Expected Discharge Date: 02/06/2024               Chief Complaint Flank pain, fever     HISTORY   Navin Teresa is a 78 year old male who presented with complaints of flank pain, fever.    Per ED doc:  Navin Teresa is a 78 year old male who presents with kidney stone issue.     Per chart review, the patient was seen at [facility] ER on 02/01/2024 for evaluation of left flank pain. Lab testing showed significant blood in his urine, but no evidence of infection making pyelonephritis unlikely. CT imaging does not show any evidence of  inflammation of the bowel, but does reveal left-sided urolithiasis, 8 mm in size. [facility] consulted and recommended outpatient follow-up in clinic.     The patient is here for ongoing left flank pain since he was found to have a left-sided kidney stone recently. He developed a new onset of fever with a recorded temperature of 100.4 F at home with worsening left flank pain. His family states the patient has been very weak over the past day and is usually up on his feet. He has not been drinking much water today. No other complaints or concerns at this time.     Otherwise, the patient denied cough, dysuria, and any other medical complaints or concerns at this time.    Denies melena. No chest pain, dyspnea, abdominal pain. Has had some nausea, vomiting. No edema. Has had fever.   Past Medical History     No past medical history on file.     Surgical History   History reviewed. No pertinent surgical history.  Family History    No family history on file.   Social History        Allergies   No Known Allergies  Prior to Admission Medications      Prior to Admission Medications   Prescriptions Last Dose Informant Patient Reported? Taking?   acetaminophen (TYLENOL) 500 MG tablet   No No   Sig: Take 2 tablets (1,000 mg) by mouth every 6 hours for 7 days   dimenhyDRINATE (DRAMAMINE) 50 MG tablet   No No   Sig: Take 1 tablet (50 mg) by mouth every 6 hours as needed for other (kidney stone pain management)   ondansetron (ZOFRAN ODT) 4 MG ODT tab   No No   Sig: Take 1 tablet (4 mg) by mouth every 6 hours as needed for nausea   oxyCODONE (ROXICODONE) 5 MG tablet   No No   Sig: Take 1 tablet (5 mg) by mouth every 4 hours as needed for severe pain If pain is not improved with acetaminophen and ibuprofen.      Facility-Administered Medications: None      Review of Systems     A 12 point comprehensive review of systems was negative except as noted above in HPI.    PHYSICAL EXAMINATION     Vitals      Temp:  [99.4  F (37.4  C)-100.6  F  (38.1  C)] 100.6  F (38.1  C)  Pulse:  [76-80] 77  Resp:  [16] 16  BP: ()/(49-62) 99/49  SpO2:  [93 %-96 %] 96 %    Examination   Physical Exam:    Gen: no acute distress, comfortable, drowsy  ENT: no scleral icterus, left eye appears to have suffered prior trauma, oral mucosa dry  Pulm: breathing comfortably at rest  CV: regular with occasional ectopy noted on pulse oximetry wave, occasional bigeminy.  Derm: warm, dry, slightly pale, no jaundice  Psych: appropriate affect      Pertinent Radiology     Radiology Results:   Recent Results (from the past 24 hour(s))   XR Chest Port 1 View    Narrative    EXAM: XR CHEST PORT 1 VIEW  LOCATION: [facility]  DATE: 2/4/2024    INDICATION: Tachypnea, fever  COMPARISON: None.      Impression    IMPRESSION: Borderline enlarged cardiac silhouette. Lungs are clear. No pleural effusion or pneumothorax. Mild central pulmonary vascular congestion. Aortic atherosclerotic calcification. Sternotomy wires.   CT Abdomen Pelvis w/o Contrast    Narrative    EXAM: CT ABDOMEN AND PELVIS WITHOUT CONTRAST - RENAL STONE PROTOCOL  LOCATION: [facility]  DATE/TIME: 2/4/2024 12:40 AM CST    INDICATION: Flank pain. Fever. Worsening kidney function.  COMPARISON: 02/01/2024.    TECHNIQUE: CT scan of the abdomen and pelvis was performed without oral or IV contrast. Multiplanar reformats were obtained. Dose reduction techniques were used.  CONTRAST: None.    FINDINGS:    LOWER CHEST: Mild atelectasis in the dependent aspects of both lungs. Coronary artery calcification.    HEPATOBILIARY: The gallbladder is mildly distended. A few tiny gallstones in the gallbladder.    SPLEEN: No acute findings.    PANCREAS: Unremarkable.    ADRENAL GLANDS: Unremarkable.    KIDNEYS/BLADDER: 0.7 cm calculus in the proximal to mid left ureter. Moderate dilatation of the more proximal left ureter and intrarenal collecting system. Mild left perinephric stranding. Two  nonobstructing calculi in the upper pole of the right kidney,   the largest measuring 0.5 cm. A few very tiny less than 0.3 cm diameter nonobstructing left renal calculi. No additional renal or ureteral calculi. No visualized bladder calculi. A few cysts scattered within both kidneys. No follow-up is necessary.    BOWEL: Several colonic diverticula are present without evidence of diverticulitis. Normal appendix.    LYMPH NODES: Unremarkable.    PELVIC ORGANS: Mild enlargement of the prostate gland.    MUSCULOSKELETAL: No acute findings.    OTHER: Atherosclerotic calcification in the abdominal aorta.      Impression    IMPRESSION:   1. 0.7 cm calculus in the proximal to mid left ureter, resulting in moderate obstruction. This calculus was in the more proximal left ureter at the ureteropelvic junction on the recent comparison study dated 02/01/2024. The degree of dilatation of the   left intrarenal collecting system is unchanged to slightly increased since the comparison study.  2. No other acute abnormality identified in the abdomen or pelvis.  3. A few nonobstructing bilateral renal calculi.     Navin Jones,   [facility] Medicine  [facility]   Phone: #[phone]

## 2024-02-04 NOTE — ED TRIAGE NOTES
Arrives to ED with c/o new onset fever noted at 2130 tonight and increased L flank pain that began at 1600 today. Dx with L sided kidney stone 2 days ago. 8mm. Has telehealth visit scheduled Tuesday. Taking 1gm tylenol and dramamine every 6 hours. Started taking oxycodone today for increased pain. Provider to bedside.

## 2024-02-04 NOTE — PROGRESS NOTES
MD NOTIFICATION  Dr. Granados paged regarding: FYI temperature up to 101.1 orally, did decrease to 100.4 now, other VSS.  Lactic acid was 0.9, checked per protocol.      Per Dr. Granados 2 blood cultures to be ordered.  Orders placed.

## 2024-02-04 NOTE — PROGRESS NOTES
[facility] MEDICINE PROGRESS NOTE      Summary: 78 year old male into [facility] on 2/4/2024 after presenting  To the ER with left flank pain, fevers.  He had an ER visit here on 2/1 for  Flank pain.  CT imaging at that time showed the stone with nonspecific  Edema.  He was discharged without antibiotics.      ER diagnostics showed a left 8 mm ureteral stone with associated perinephric  Stranding along with an ROMAN. He was given ivf resuscitation along with empiric  Antibiotics.  He received 1500 mls fluid.     On interview this morning he is resting though wakes easily.  He works  At [employer].  He says his oral intake is with sodas and beers.      He went for successful stent placement this morning.  I will increase  His antibiotic coverage adding vancomycin due to his history of  Enterococcus.  He will need stone extraction in the near future set  Up as an outpient    Pts anxious niece is at the bedside.  Questions were answered.       Problem list:     Sepsis du to pyelonephritis, left ureteral stone: continue empiric  Zosyn; add vanco; cultures pending; improved  hemodynamics  Kidney stone, left: infected with moderate hydronephrosis; 8 mm  Post stent placement today    3.  ROMAN:  creatinine up to 2.85 (baseline 1.4): ivf, hold metformin, no nsaids,  4.  Anemia, acute on chronic: stable   5.  HTN: normally on metoprolol succinate 200 daily (cut to 100);   Holding losartan and thiazide due to acute status; resume   When more stable  6.  CAD, history of CABG: isosorbide mononitrate 20 mg daily;    He has a 4 liter oxygen need with 3200 BNP; echo pending   Will likely need diuresis tomorrow  7.  DM2, noninsulin: will hold metformin; insulin bumps if needed  8.  LISE: cpap with home settings   9.  BPH: continue flomax; [place]away management TBD  10.  Acute hypoxic respiratory failure due to cardiac congestion;   Will likely need diuretic though holdoff today due to status  11.  Dvt prevention:   juancarlos Granados MD  [facility] Medicine  [facility]  Phone: #[phone]  Securely message with the Vocera Web Console (learn more here)  Text page via [employer] Paging/Directory     Interval History/Subjective: wakes easily      Physical Exam/Objective:  Temp:  [98.9  F (37.2  C)-100.6  F (38.1  C)] 98.9  F (37.2  C)  Pulse:  [59-85] 66  Resp:  [14-18] 16  BP: ()/(49-62) 127/60  SpO2:  [91 %-96 %] 91 %  Body mass index is 28.14 kg/m .    GENERAL:  Drowsy; wakes, understands events   HEAD:  Normocephalic, without obvious abnormality, atraumatic   EYES:  PERRL, conjunctiva/corneas clear, no scleral icterus, EOM's intact   NOSE: Nares normal, septum midline, mucosa normal, no drainage   THROAT: Lips, mucosa, and tongue normal; teeth and gums normal, mouth moist   NECK: Supple, symmetrical, trachea midline   BACK:   Symmetric, no curvature, ROM normal   LUNGS:   Clear to auscultation bilaterally, no rales, rhonchi, or wheezing, symmetric chest rise on inhalation, respirations unlabored   CHEST WALL:  No tenderness or deformity   HEART:  Regular rate and rhythm, S1 and S2 normal, no murmur, rub, or gallop    ABDOMEN:   Soft, non-tender, bowel sounds active all four quadrants, no masses, no organomegaly, no rebound or guarding   EXTREMITIES: Extremities normal, atraumatic, no cyanosis or edema    SKIN: Dry to touch, no exanthems in the visualized areas   NEURO: Alert, oriented x3, moves all four extremities freely   PSYCH: Cooperative, behavior is appropriate      Data reviewed today: I personally reviewed all new medications, labs, imaging/diagnostics reports over the past 24 hours. Pertinent findings include:    Imaging:   Recent Results (from the past 24 hour(s))   XR Chest Port 1 View    Narrative    EXAM: XR CHEST PORT 1 VIEW  LOCATION: [facility]  DATE: 2/4/2024    INDICATION: Tachypnea, fever  COMPARISON: None.      Impression     IMPRESSION: Borderline enlarged cardiac silhouette. Lungs are clear. No pleural effusion or pneumothorax. Mild central pulmonary vascular congestion. Aortic atherosclerotic calcification. Sternotomy wires.   CT Abdomen Pelvis w/o Contrast    Narrative    EXAM: CT ABDOMEN AND PELVIS WITHOUT CONTRAST - RENAL STONE PROTOCOL  LOCATION: [facility]  DATE/TIME: 2/4/2024 12:40 AM CST    INDICATION: Flank pain. Fever. Worsening kidney function.  COMPARISON: 02/01/2024.    TECHNIQUE: CT scan of the abdomen and pelvis was performed without oral or IV contrast. Multiplanar reformats were obtained. Dose reduction techniques were used.  CONTRAST: None.    FINDINGS:    LOWER CHEST: Mild atelectasis in the dependent aspects of both lungs. Coronary artery calcification.    HEPATOBILIARY: The gallbladder is mildly distended. A few tiny gallstones in the gallbladder.    SPLEEN: No acute findings.    PANCREAS: Unremarkable.    ADRENAL GLANDS: Unremarkable.    KIDNEYS/BLADDER: 0.7 cm calculus in the proximal to mid left ureter. Moderate dilatation of the more proximal left ureter and intrarenal collecting system. Mild left perinephric stranding. Two nonobstructing calculi in the upper pole of the right kidney,   the largest measuring 0.5 cm. A few very tiny less than 0.3 cm diameter nonobstructing left renal calculi. No additional renal or ureteral calculi. No visualized bladder calculi. A few cysts scattered within both kidneys. No follow-up is necessary.    BOWEL: Several colonic diverticula are present without evidence of diverticulitis. Normal appendix.    LYMPH NODES: Unremarkable.    PELVIC ORGANS: Mild enlargement of the prostate gland.    MUSCULOSKELETAL: No acute findings.    OTHER: Atherosclerotic calcification in the abdominal aorta.      Impression    IMPRESSION:   1. 0.7 cm calculus in the proximal to mid left ureter, resulting in moderate obstruction. This calculus was in the more proximal left  ureter at the ureteropelvic junction on the recent comparison study dated 02/01/2024. The degree of dilatation of the   left intrarenal collecting system is unchanged to slightly increased since the comparison study.  2. No other acute abnormality identified in the abdomen or pelvis.  3. A few nonobstructing bilateral renal calculi.       Labs:  Most Recent 3 BMP's:  Recent Labs   Lab Test 02/04/24  0634 02/04/24  0518 02/04/24  0345 02/04/24  0215 02/03/24  2347 02/01/24  1547   NA  --  137  --   --  139 138   POTASSIUM  --  4.8  --   --  4.9 4.7   CHLORIDE  --  108*  --   --  107 106   CO2  --  21*  --   --  22 23   BUN  --  41.3*  --   --  44.3* 31.3*   CR  --  2.76*  --   --  2.85* 1.46*   ANIONGAP  --  8  --   --  10 9   ORTEGA  --  8.7*  --   --  8.8 10.2   * 152* 144*   < > 178* 277*    < > = values in this interval not displayed.       Medications:   Personally Reviewed.  Medications    sodium chloride 100 mL/hr at 02/04/24 0157      insulin aspart  1-6 Units Subcutaneous Q4H    piperacillin-tazobactam  3.375 g Intravenous Q8H    sodium chloride (PF)  3 mL Intracatheter Q8H

## 2024-02-04 NOTE — ANESTHESIA PREPROCEDURE EVALUATION
Anesthesia Pre-Procedure Evaluation    Patient: Navin Teresa   MRN: [id number] : 1945        Procedure : Procedure(s):  CYSTOURETEROSCOPY, WITH LEFT RETROGRADE PYELOGRAM AND LET URETERAL STENT INSERTION          History reviewed. No pertinent past medical history.   History reviewed. No pertinent surgical history.   No Known Allergies   Social History     Tobacco Use     Smoking status: Not on file     Smokeless tobacco: Not on file   Substance Use Topics     Alcohol use: Not on file      Wt Readings from Last 1 Encounters:   24 81.5 kg (179 lb 11.2 oz)        Anesthesia Evaluation            ROS/MED HX  ENT/Pulmonary:       Neurologic:       Cardiovascular: Comment: LAST ECHO: 11/15/2018.  Echo 2018-11-15 14:45.  Normal LV size, wall thickness, and systolic function.  Normal RV size and function.  Mild tricuspid regurgitation.  Aortic valve sclerosis  Mitral annular calcification  Borderline to mildly dilated ascending aorta  Left Ventricular Ejection Fraction: 65 %       (+)  hypertension- -  CAD -  CABG-date: . -                                      METS/Exercise Tolerance:     Hematologic:       Musculoskeletal:       GI/Hepatic:       Renal/Genitourinary:     (+) renal disease, type: CRI,     Nephrolithiasis ,       Endo:     (+)  type II DM,                    Psychiatric/Substance Use:       Infectious Disease:       Malignancy:       Other:               OUTSIDE LABS:  CBC:   Lab Results   Component Value Date    WBC 10.8 2024    WBC 12.5 (H) 2024    HGB 10.3 (L) 2024    HGB 10.7 (L) 2024    HCT 32.8 (L) 2024    HCT 32.9 (L) 2024     (L) 2024     2024     BMP:   Lab Results   Component Value Date     2024     2024    POTASSIUM 4.8 2024    POTASSIUM 4.9 2024    CHLORIDE 108 (H) 2024    CHLORIDE 107 2024    CO2 21 (L) 2024    CO2 22 2024    BUN 41.3 (H) 2024    BUN  "44.3 (H) 02/03/2024    CR 2.76 (H) 02/04/2024    CR 2.85 (H) 02/03/2024     (H) 02/04/2024     (H) 02/04/2024     COAGS: No results found for: \"PTT\", \"INR\", \"FIBR\"  POC: No results found for: \"BGM\", \"HCG\", \"HCGS\"  HEPATIC:   Lab Results   Component Value Date    ALBUMIN 3.5 02/03/2024    PROTTOTAL 6.4 02/03/2024    ALT 26 02/03/2024    AST 18 02/03/2024    ALKPHOS 25 (L) 02/03/2024    BILITOTAL 1.1 02/03/2024     OTHER:   Lab Results   Component Value Date    LACT 1.1 02/03/2024    A1C 7.6 (H) 02/03/2024    ORTEGA 8.7 (L) 02/04/2024    CRP 0.2 05/13/2023       Anesthesia Plan    ASA Status:  3, emergent    NPO Status:  NPO Appropriate    Anesthesia Type: MAC.              Consents    Anesthesia Plan(s) and associated risks, benefits, and realistic alternatives discussed. Questions answered and patient/representative(s) expressed understanding.     - Discussed: Risks, Benefits and Alternatives for BOTH SEDATION and the PROCEDURE were discussed     - Discussed with:  Patient            Postoperative Care    Pain management: IV analgesics, Oral pain medications.   PONV prophylaxis: Ondansetron (or other 5HT-3)     Comments:    Other Comments: Pt reportedly last took ozempic > 3 weeks ago.             Tushar Jones,     I have reviewed the pertinent notes and labs in the chart from the past 30 days and (re)examined the patient.  Any updates or changes from those notes are reflected in this note.      # Hypercalcemia: Highest Ca = 10.2 mg/dL in last 30 days, will monitor as appropriate         # DMII: A1C = 7.6 % (Ref range: <5.7 %) within past 6 months  # Overweight: Estimated body mass index is 28.14 kg/m  as calculated from the following:    Height as of this encounter: 1.702 m (5' 7\").    Weight as of this encounter: 81.5 kg (179 lb 11.2 oz).      "

## 2024-02-04 NOTE — PHARMACY-ADMISSION MEDICATION HISTORY
Pharmacist Admission Medication History    Admission medication history is complete. The information provided in this note is only as accurate as the sources available at the time of the update.    Information Source(s): Family member and CareEverywhere/SureScripts via in-person    Pertinent Information: Discussed with family present in room who assists with managing medications at home. They report that patient has been off of Ozemic for ~ 3 weeks and was planning on resuming tomorrow and retitrating back up. Has not restarted yet (not added to med list below).    Changes made to PTA medication list:  Added: amlodipine, Zetia, losartan/hydrochlorothiazide, rosuvastatin, famotidine, isosorbide, metformin, metoprolol, tamsulosin, ntg, iron  Deleted: None  Changed: None    Allergies reviewed with patient and updates made in EHR: yes    Medication History Completed By: Lisa Yuan, PharmD 2/4/2024 7:59 AM    Prior to Admission medications    Medication Sig Last Dose Taking? Auth Provider Long Term End Date   acetaminophen (TYLENOL) 500 MG tablet Take 2 tablets (1,000 mg) by mouth every 6 hours for 7 days 2/3/2024 at pm Yes Navin Diamond, DO  2/8/24   amLODIPine (NORVASC) 10 MG tablet Take 10 mg by mouth every evening 2/3/2024 at pm Yes Unknown, Entered By History Yes    dimenhyDRINATE (DRAMAMINE) 50 MG tablet Take 1 tablet (50 mg) by mouth every 6 hours as needed for other (kidney stone pain management) 2/3/2024 at pm Yes Navin Diamond, DO  2/8/24   ezetimibe (ZETIA) 10 MG tablet Take 10 mg by mouth every evening 2/3/2024 at pm Yes Unknown, Entered By History Yes    famotidine (PEPCID) 20 MG tablet Take 20 mg by mouth 2 times daily 2/3/2024 at pm Yes Unknown, Entered By History     ferrous sulfate (FEROSUL) 325 (65 Fe) MG tablet Take 325 mg by mouth every evening 2/3/2024 at pm Yes Unknown, Entered By History     isosorbide mononitrate (ISMO/MONOKET) 20 MG tablet Take 20 mg by mouth every evening  2/3/2024 at pm Yes Unknown, Entered By History Yes    losartan-hydrochlorothiazide (HYZAAR) 100-12.5 MG tablet Take 1 tablet by mouth every evening 2/3/2024 at pm Yes Unknown, Entered By History Yes    metFORMIN (GLUCOPHAGE XR) 500 MG 24 hr tablet Take 1,000 mg by mouth 2 times daily (with meals) 2/3/2024 at pm Yes Unknown, Entered By History Yes    metoprolol succinate ER (TOPROL XL) 200 MG 24 hr tablet Take 200 mg by mouth every evening 2/3/2024 at pm Yes Unknown, Entered By History Yes    nitroGLYcerin (NITROSTAT) 0.4 MG sublingual tablet Place 0.4 mg under the tongue every 5 minutes as needed for chest pain For chest pain place 1 tablet under the tongue every 5 minutes for 3 doses. If symptoms persist 5 minutes after 1st dose call 911. Unknown at prn Yes Unknown, Entered By History Yes    ondansetron (ZOFRAN ODT) 4 MG ODT tab Take 1 tablet (4 mg) by mouth every 6 hours as needed for nausea 2/2/2024 at prn Yes Navin Diamond, DO  2/4/24   oxyCODONE (ROXICODONE) 5 MG tablet Take 1 tablet (5 mg) by mouth every 4 hours as needed for severe pain If pain is not improved with acetaminophen and ibuprofen. 2/3/2024 at pm Yes Navin Diamond, DO  2/5/24   rosuvastatin (CRESTOR) 40 MG tablet Take 40 mg by mouth every evening 2/3/2024 at pm Yes Unknown, Entered By History Yes    tamsulosin (FLOMAX) 0.4 MG capsule Take 0.4 mg by mouth every evening 2/3/2024 at pm Yes Unknown, Entered By History

## 2024-02-04 NOTE — CONSULTS
Urology Consult, History and Physical    Name: Navin Teresa    MRN: [id number]   YOB: 1945               Chief Complaint:   Left flank pain    History is obtained from the patient and chart review          History of Present Illness:   Navin Teresa is a 78 year old male with known left obstructing ureteral stones. He presented again to the ED overnight with Left flank pain along with nausea. Pain control at home not sufficient and reports low grade temperature. No chills. Denies hematuria, dysuria. He had a virtual visit with Dr. Hernandez earlier this week with plans for subsequent in-preson  visit for discussion regarding treatment for kidney stone removal.     CBC is noted for no leukocytosis  BMP is noted for elevated Cr at 2.85 from baseline 1.4.  UA was not concerning for active infection.             Past Medical History:   History reviewed. No pertinent past medical history.         Past Surgical History:   History reviewed. No pertinent surgical history.         Social History:     Social History     Tobacco Use    Smoking status: Not on file    Smokeless tobacco: Not on file   Substance Use Topics    Alcohol use: Not on file            Family History:   No family history on file.         Allergies:   No Known Allergies         Medications:     Current Facility-Administered Medications   Medication    amLODIPine (NORVASC) tablet 10 mg    benzocaine-menthol (CEPACOL) 15-3.6 MG lozenge 1 lozenge    calcium carbonate (TUMS) chewable tablet 1,000 mg    glucose gel 15-30 g    Or    dextrose 50 % injection 25-50 mL    Or    glucagon injection 1 mg    docusate sodium (COLACE) capsule 100 mg    famotidine (PEPCID) tablet 20 mg    guaiFENesin-dextromethorphan (ROBITUSSIN DM) 100-10 MG/5ML syrup 10 mL    HYDROmorphone (DILAUDID) injection 0.2 mg    HYDROmorphone (DILAUDID) injection 0.4 mg    insulin aspart (NovoLOG) injection (RAPID ACTING)    isosorbide mononitrate (ISMO/MONOKET) tablet 20 mg     lidocaine (LMX4) cream    lidocaine 1 % 0.1-1 mL    metoprolol succinate ER (TOPROL XL) 24 hr tablet 100 mg    ondansetron (ZOFRAN ODT) ODT tab 4 mg    Or    ondansetron (ZOFRAN) injection 4 mg    piperacillin-tazobactam (ZOSYN) 3.375 g vial to attach to  mL bag    prochlorperazine (COMPAZINE) injection 5 mg    Or    prochlorperazine (COMPAZINE) tablet 5 mg    Or    prochlorperazine (COMPAZINE) suppository 12.5 mg    rosuvastatin (CRESTOR) tablet 40 mg    senna-docusate (SENOKOT-S/PERICOLACE) 8.6-50 MG per tablet 1 tablet    Or    senna-docusate (SENOKOT-S/PERICOLACE) 8.6-50 MG per tablet 2 tablet    sodium chloride (PF) 0.9% PF flush 3 mL    sodium chloride (PF) 0.9% PF flush 3 mL    sodium chloride 0.9 % infusion    tamsulosin (FLOMAX) capsule 0.4 mg             Review of Systems:   Review Of Systems  Skin: negative  Eyes: negative  Ears/Nose/Throat: negative  Respiratory: No shortness of breath, dyspnea on exertion, cough, or hemoptysis  Cardiovascular: negative  Gastrointestinal: negative  Genitourinary: as above  Musculoskeletal: negative  Neurologic: negative  Psychiatric: negative  Hematologic/Lymphatic/Immunologic: negative  Endocrine: negative            Physical Exam:   VS:  T: 100.6    HR: 68    BP: 103/54    RR: 16   GEN:  AOx3.  NAD.  Pleasant.  HEENT:  Sclerae anicteric.  Conjunctivae pink.  MMM.  NECK:  Supple.  No LAD.  CV:  RRR  LUNGS: Non-labored breathing.  BACK:  No midline or CVA tenderness.  ABD:  Soft.  NT.  ND.  No rebound or guarding.  No masses.  :  no CVA tenderness  EXT:  Warm, well perfused.  no edema.  SKIN:  Warm.  Dry.  No rashes.  NEURO:  CN grossly intact.  WENDY..          Data:   Pertinent labs and imaging as above         Impression and Plan:   Impression:   Navin Teresa is a 78 year old male with a Left UPJ stone of several days duration, who now presents with worsening pain and ROMAN      Plan:     -plan for OR for left ureteral stent placement  -will need definitive  stone treatment at a later date.

## 2024-02-04 NOTE — PLAN OF CARE
Problem: Adult Inpatient Plan of Care  Goal: Optimal Comfort and Wellbeing  Outcome: Progressing  Intervention: Monitor Pain and Promote Comfort  Recent Flowsheet Documentation  Taken 2/4/2024 0322 by Jaylin Yee RN  Pain Management Interventions: rest   Goal Outcome Evaluation:  PRIMARY DIAGNOSIS: ACUTE PAIN  OUTPATIENT/OBSERVATION GOALS TO BE MET BEFORE DISCHARGE:  1. Pain Status: Pain free.    2. Return to near baseline physical activity: No. Pt has generalized weakness may require Ax2.     3. Cleared for discharge by consultants (if involved): No    Discharge Planner Nurse   Safe discharge environment identified: Yes  Barriers to discharge: No       Entered by: Jaylin Yee RN 02/04/2024 5:07 AM         Pt arrived on unit at 0315 hours. Pt is alert and oriented x4. VSS with 4 liters NC. Denied any pain, HA, SOB, or N/V. NPO. Q4 BS checks.     Able to ambulate with Ax2 d/t generalized weakness. Scored A=5, C=5, D=6. Requires Ax1 with ADLs.

## 2024-02-04 NOTE — OP NOTE
OPERATIVE REPORT    PREOPERATIVE DIAGNOSIS:  Left ureteral stone(s)    POSTOPERATIVE DIAGNOSIS: Same    PROCEDURES PERFORMED:   -Cystourethroscopy  -Left retrograde pyelogram with intraoperative interpretation of images  -Left ureteral stent placement    STAFF SURGEON: Bandar Haley MD    ANESTHESIA: MAC  ESTIMATED BLOOD LOSS: 1 ml  COMPLICATIONS: None.   SPECIMEN: None    BRIEF OPERATIVE INDICATIONS: Navin Teresa is a(n) 78 year old male who presented with upper tract obstruction secondary to left ureteral stone.  Due evidence of intractable pain and ROMAN, the decision was made to offer cystoscopy, ureteral stent placement.  The patient was counseled on the possibility of stent discomfort.  After a discussion of all risks, benefits, and alternatives, the patient elected to proceed. The patient understands the potential need for more than one procedure to eliminate all stone burden.      DESCRIPTION OF PROCEDURE:  After informed consent was obtained, the patient was transported to the operating room & placed supine on the table. After adequate anesthesia was induced, the patient was placed in lithotomy and prepped and draped in the usual sterile fashion. A timeout was taken to confirm correct patient, procedure and laterality. Pre-operative IV antibiotics were administered.     A 19-Lao rigid cystoscope was inserted into a well-lubricated urethra. The urethra was unremarkable without stricture. Prostate was enlarged and high riding. Prostate was also friable with some oozing during case. A cystoscopic evaluation demonstration demonstrated no abnormal findings.  The bilateral ureteral orifices were in orthotopic position.     A curved tip guidewire was used due to difficulty intubating the ureter given enlarged prostate and was advanced easily to the upper tract.  A retrograde pyelogram was performed, which demonstrated hydoureteronephrosis.  The wire was replaced and the 5 Fr catheter was backloaded off. A 6  x 26 ureteral stent was advanced with noted proximal and distal curls on fluoroscopy.  The bladder was emptied.    The patient tolerated the procedure well and there were no apparent complications. The patient  was transported to the postanesthesia care unit in stable condition.        POSTOP PLAN:   Plan for definitive stone management with Dr. Hernandez, will send message to schedule  Can discharge from urology perspective as long as there is close follow up to ensure proper downtrending of Cr and resolution of ROMAN.

## 2024-02-04 NOTE — PLAN OF CARE
Patient continues to run low-grade temps and require 4 L O2 per NC or oxymask to maintain spO2 > 90%. Tele sinus rhythm/sinus tach with PACs. Tolerated stent placement procedure well. Denies pain. Diet advanced. Post-procedure void of only 25 mL, urine bloody with small clot. Will continue to monitor output.

## 2024-02-04 NOTE — ANESTHESIA CARE TRANSFER NOTE
Patient: Navin Teresa    Procedure: Procedure(s):  CYSTOURETEROSCOPY, WITH LEFT RETROGRADE PYELOGRAM AND LEFT URETERAL STENT INSERTION       Diagnosis: Kidney stone [N20.0]  Diagnosis Additional Information: No value filed.    Anesthesia Type:   No value filed.     Note:    Oropharynx: oropharynx clear of all foreign objects and spontaneously breathing  Level of Consciousness: awake  Oxygen Supplementation: face mask  Level of Supplemental Oxygen (L/min / FiO2): 8  Independent Airway: airway patency satisfactory and stable  Dentition: dentition unchanged  Vital Signs Stable: post-procedure vital signs reviewed and stable  Report to RN Given: handoff report given  Patient transferred to: Telemetry/Step Down Unit    Handoff Report: Identifed the Patient, Identified the Reponsible Provider, Reviewed the pertinent medical history, Discussed the surgical course, Reviewed Intra-OP anesthesia mangement and issues during anesthesia, Set expectations for post-procedure period and Allowed opportunity for questions and acknowledgement of understanding      Vitals:  Vitals Value Taken Time   BP 96/53 02/04/24 1208   Temp 37.9  C (100.2  F) 02/04/24 1208   Pulse 67 02/04/24 1208   Resp 24 02/04/24 1208   SpO2 94 % 02/04/24 1208       Electronically Signed By: RAISA FOOTE CRNA  February 4, 2024  12:09 PM

## 2024-02-04 NOTE — PHARMACY-VANCOMYCIN DOSING SERVICE
"Pharmacy Vancomycin Initial Note  Date of Service 2024  Patient's  1945  78 year old, male    Indication: Urinary Tract Infection and history of enterococcus    Current estimated CrCl = Estimated Creatinine Clearance: 22.2 mL/min (A) (based on SCr of 2.76 mg/dL (H)).    Creatinine for last 3 days  2024:  3:47 PM Creatinine 1.46 mg/dL  2/3/2024: 11:47 PM Creatinine 2.85 mg/dL  2024:  5:18 AM Creatinine 2.76 mg/dL    Recent Vancomycin Level(s) for last 3 days  No results found for requested labs within last 3 days.      Vancomycin IV Administrations (past 72 hours)        No vancomycin orders with administrations in past 72 hours.                    Nephrotoxins and other renal medications (From now, onward)      Start     Dose/Rate Route Frequency Ordered Stop    24 1330  vancomycin (VANCOCIN) 750 mg in 0.9% NaCl 250 mL intermittent infusion        See Hyperspace for full Linked Orders Report.    750 mg  over 60 Minutes Intravenous EVERY 24 HOURS 24 1303      24 1300  vancomycin (VANCOCIN) 1,500 mg in 0.9% NaCl 250 mL intermittent infusion        See Hyperspace for full Linked Orders Report.    1,500 mg  over 90 Minutes Intravenous ONCE 24 1303      24 0600  piperacillin-tazobactam (ZOSYN) 3.375 g vial to attach to  mL bag        Note to Pharmacy: For [facility], [facility] and [facility]: For Zosyn-naive patients, use the \"Zosyn initial dose + extended infusion\" order panel.    3.375 g  over 240 Minutes Intravenous EVERY 8 HOURS 24 0134              Contrast Orders - past 72 hours (72h ago, onward)      Start     Dose/Rate Route Frequency Stop    24 1150  iohexol (OMNIPAQUE) 300 mg/mL injection  Status:  Discontinued           PRN 24 1210            [employer] Prediction of Planned Initial Vancomycin Regimen  Loading dose: 1500 mg at 13:03 2024.  Regimen: 750 mg IV every 24 hours.  Start time: 13:03 on 2024  Exposure target: AUC24 (range)400-600 " mg/L.hr   AUC24,ss: 568 mg/L.hr  Probability of AUC24 > 400: 85 %  Ctrough,ss: 20.1 mg/L  Probability of Ctrough,ss > 20: 50 %  Probability of nephrotoxicity (Lodise TIERRA 2009): 17 %          Plan:  Start vancomycin  1500 mg IV load once, followed by 750mg Q24H.   Vancomycin monitoring method: AUC  Vancomycin therapeutic monitoring goal: 400-600 mg*h/L  Pharmacy will check vancomycin levels as appropriate in 1-3 Days.    Serum creatinine levels will be ordered daily for the first week of therapy and at least twice weekly for subsequent weeks.      Lisa Yuan, JoeD

## 2024-02-04 NOTE — ED PROVIDER NOTES
EMERGENCY DEPARTMENT ENCOUNTER      NAME: Navin Teresa  AGE: 78 year old male  YOB: 1945  MRN: [id number]  EVALUATION DATE & TIME: 2/3/2024 11:12 PM    PCP: Paolo Joyce    ED PROVIDER: Anastasia Diaz M.D.      Chief Complaint   Patient presents with    Fever         FINAL IMPRESSION:  1. Sepsis, due to unspecified organism, unspecified whether acute organ dysfunction present (H)    2. Kidney stone    3. ROMAN (acute kidney injury) (H24)    4. Left flank pain        MEDICAL DECISION MAKING:    Pertinent Labs & Imaging studies reviewed. (See chart for details)  ED Course as of 02/04/24 0342   Sat Feb 03, 2024   2332 Patient coming in for evaluation of left-sided flank pain, fever, increased fatigue, weakness.  Was seen in the emergency department on 2/1.  Diagnosed at that time with an obstructing 5 x 8 x 7 mm left ureteropelvic junction stone with mild left-sided hydronephrosis and asymmetric perinephric edema.  Sent home with medications for pain control and follow-up with [facility].  Urinalysis at that time did not show any evidence for infection.  Per family she has been very weak over the past day.  He is a gentleman that usually works a job at [employer] and walks almost 20,000 steps a day and he has been sleeping constantly, much more lethargic and fatigued.  Fever noted today.  Still complaining of some significant left-sided flank pain.  No urinary issues.  Apparently has not been feeling ill over the last few days.    On exam here patient lying in bed with eyes closed but will wake and answer questions appropriately.  Heart regular rate and rhythm but mildly tachypneic with some mild increased work of breathing and some decreased breath sounds at bilateral bases.  Abdomen soft nontender.  Does not have CVA tenderness or tenderness along his abdomen with palpation.  Remainder unremarkable.    Patient's oxygen saturations here 92% on room air, does have some increased work of breathing.  When the  patient's family member stated that he had said he was feeling slightly short of breath.  Will start sepsis workup for patient here.  Added D-dimer.  Will already start patient on antibiotics with concern for possibly infected stone.   2356 Rectal temperature for patient here was 100.6.   Sun Feb 04, 2024   0016 Patient's labs coming back here D-dimer is within normal limits.  Does have some mild leukocytosis.  Normal lactic acid.  CT scan abdomen pelvis and chest x-ray.   0108 Spoke with urology, they are likely going to put a stent in in the morning.  Awaiting call from hospitalist service.   0117 Patient's chest x-ray here perhaps shows some mild fluid overload.  Added on a BNP.  Has been doing well, blood pressures have been stable but did have 1 value that was lower at 100s over 50s.         Medical Decision Making  Obtained supplemental history:Supplemental history obtained?: Documented in chart  Reviewed external records: External records reviewed?: Documented in chart and Inpatient Record: 02/01/2024  Care impacted by chronic illness:Chronic Kidney Disease, Diabetes, Heart Disease, Hyperlipidemia, and Hypertension  Care significantly affected by social determinants of health:Access to Medical Care  Did you consider but not order tests?: Work up considered but not performed and documented in chart, if applicable  Did you interpret images independently?: Independent interpretation of ECG and images noted in documentation, when applicable.  Consultation discussion with other provider:Did you involve another provider (consultant, , pharmacy, etc.)?: I discussed the care with another health care provider, see documentation for details.  Admit.      Critical care: 0 minutes excluding separately billable procedures.  Includes bedside management, time reviewing test results, review of records, discussing the case with staff, documenting the medical record and time spent with family members (or surrogate decision  makers) discussing specific treatment issues.          ED COURSE:  11:17 PM I met with the patient, obtained history, performed an initial exam, and discussed options and plan for diagnostics and treatment here in the ED.   1:05 AM I spoke with Dr. Haley, [facility]  0115 I Spoke with Dr. Jones, Hospitalist. We further discussed the patient's case and reviewed ED work-up so far.  agrees to admit the patient.    The importance of close follow up was discussed. We reviewed warning signs and symptoms, and I instructed Mr. Teresa to return to the emergency department immediately if he develops any new or worsening symptoms. I provided additional verbal discharge instructions. Mr. Teresa expressed understanding and agreement with this plan of care, his questions were answered, and he was discharged in stable condition.     MEDICATIONS GIVEN IN THE EMERGENCY:  Medications   lidocaine 1 % 0.1-1 mL (has no administration in time range)   lidocaine (LMX4) cream (has no administration in time range)   sodium chloride (PF) 0.9% PF flush 3 mL (3 mLs Intracatheter $Given 2/4/24 0207)   sodium chloride (PF) 0.9% PF flush 3 mL (has no administration in time range)   senna-docusate (SENOKOT-S/PERICOLACE) 8.6-50 MG per tablet 1 tablet (has no administration in time range)     Or   senna-docusate (SENOKOT-S/PERICOLACE) 8.6-50 MG per tablet 2 tablet (has no administration in time range)   calcium carbonate (TUMS) chewable tablet 1,000 mg (has no administration in time range)   sodium chloride 0.9 % infusion ( Intravenous $New Bag 2/4/24 0157)   HYDROmorphone (DILAUDID) injection 0.2 mg (has no administration in time range)   HYDROmorphone (DILAUDID) injection 0.4 mg (has no administration in time range)   docusate sodium (COLACE) capsule 100 mg (has no administration in time range)   ondansetron (ZOFRAN ODT) ODT tab 4 mg (has no administration in time range)     Or   ondansetron (ZOFRAN) injection 4 mg (has no administration in time range)    prochlorperazine (COMPAZINE) injection 5 mg (has no administration in time range)     Or   prochlorperazine (COMPAZINE) tablet 5 mg (has no administration in time range)     Or   prochlorperazine (COMPAZINE) suppository 12.5 mg (has no administration in time range)   benzocaine-menthol (CEPACOL) 15-3.6 MG lozenge 1 lozenge (has no administration in time range)   guaiFENesin-dextromethorphan (ROBITUSSIN DM) 100-10 MG/5ML syrup 10 mL (has no administration in time range)   piperacillin-tazobactam (ZOSYN) 3.375 g vial to attach to  mL bag (has no administration in time range)   glucose gel 15-30 g (has no administration in time range)     Or   dextrose 50 % injection 25-50 mL (has no administration in time range)     Or   glucagon injection 1 mg (has no administration in time range)   insulin aspart (NovoLOG) injection (RAPID ACTING) (1 Units Subcutaneous $Given 2/4/24 0255)   sodium chloride 0.9% BOLUS 1,000 mL (1,000 mLs Intravenous $New Bag 2/4/24 0236)   piperacillin-tazobactam (ZOSYN) 3.375 g vial to attach to  mL bag (0 g Intravenous Stopped 2/4/24 0048)   acetaminophen (TYLENOL) tablet 975 mg (975 mg Oral $Given 2/4/24 0010)   sodium chloride 0.9% BOLUS 500 mL (0 mLs Intravenous Stopped 2/4/24 0200)       NEW PRESCRIPTIONS STARTED AT TODAY'S ER VISIT:  Current Discharge Medication List             =================================================================    HPI    Patient information was obtained from: The patient    Use of : N/A       Navin Teresa is a 78 year old male who presents with kidney stone issue.    Per chart review, the patient was seen at [facility] on 02/01/2024 for evaluation of left flank pain. Lab testing showed significant blood in his urine, but no evidence of infection making pyelonephritis unlikely. CT imaging does not show any evidence of inflammation of the bowel, but does reveal left-sided urolithiasis, 8 mm in size. [facility] consulted and recommended  outpatient follow-up in clinic.    The patient is here for ongoing left flank pain since he was found to have a left-sided kidney stone recently. He developed a new onset of fever with a recorded temperature of 100.4 F at home with worsening left flank pain. His family states the patient has been very weak over the past day and is usually up on his feet. He has not been drinking much water today. No other complaints or concerns at this time.    Otherwise, the patient denied cough, dysuria, and any other medical complaints or concerns at this time.      REVIEW OF SYSTEMS   Review of Systems   Constitutional:  Positive for fever.   Respiratory:  Negative for cough.    Genitourinary:  Positive for flank pain (left). Negative for dysuria.   Neurological:  Positive for weakness.   All other systems reviewed and are negative.        PAST MEDICAL HISTORY:  History reviewed. No pertinent past medical history.    PAST SURGICAL HISTORY:  History reviewed. No pertinent surgical history.    CURRENT MEDICATIONS:      Current Facility-Administered Medications:     benzocaine-menthol (CEPACOL) 15-3.6 MG lozenge 1 lozenge, 1 lozenge, Buccal, Q1H PRN, Navin Jones,     calcium carbonate (TUMS) chewable tablet 1,000 mg, 1,000 mg, Oral, 4x Daily PRN, Navin Jones,     glucose gel 15-30 g, 15-30 g, Oral, Q15 Min PRN **OR** dextrose 50 % injection 25-50 mL, 25-50 mL, Intravenous, Q15 Min PRN **OR** glucagon injection 1 mg, 1 mg, Subcutaneous, Q15 Min PRN, Navin Jones,     docusate sodium (COLACE) capsule 100 mg, 100 mg, Oral, BID PRN, Navin Jones,     guaiFENesin-dextromethorphan (ROBITUSSIN DM) 100-10 MG/5ML syrup 10 mL, 10 mL, Oral, Q4H PRN, Navin Jones,     HYDROmorphone (DILAUDID) injection 0.2 mg, 0.2 mg, Intravenous, Q2H PRN, Navin Jones,     HYDROmorphone (DILAUDID) injection 0.4 mg, 0.4 mg, Intravenous, Q2H PRN, Navin Jones,     insulin aspart (NovoLOG) injection (RAPID ACTING), 1-6 Units,  "Subcutaneous, Q4H, Navin Jones, , 1 Units at 02/04/24 0255    lidocaine (LMX4) cream, , Topical, Q1H PRN, Navin Jones DO    lidocaine 1 % 0.1-1 mL, 0.1-1 mL, Other, Q1H PRN, Navin Jones,     ondansetron (ZOFRAN ODT) ODT tab 4 mg, 4 mg, Oral, Q6H PRN **OR** ondansetron (ZOFRAN) injection 4 mg, 4 mg, Intravenous, Q6H PRN, Navin Jones DO    piperacillin-tazobactam (ZOSYN) 3.375 g vial to attach to  mL bag, 3.375 g, Intravenous, Q8H, Navin Jones DO    prochlorperazine (COMPAZINE) injection 5 mg, 5 mg, Intravenous, Q6H PRN **OR** prochlorperazine (COMPAZINE) tablet 5 mg, 5 mg, Oral, Q6H PRN **OR** prochlorperazine (COMPAZINE) suppository 12.5 mg, 12.5 mg, Rectal, Q12H PRN, Navin Jones DO    senna-docusate (SENOKOT-S/PERICOLACE) 8.6-50 MG per tablet 1 tablet, 1 tablet, Oral, BID PRN **OR** senna-docusate (SENOKOT-S/PERICOLACE) 8.6-50 MG per tablet 2 tablet, 2 tablet, Oral, BID PRN, Navin Jones DO    sodium chloride (PF) 0.9% PF flush 3 mL, 3 mL, Intracatheter, Q8H, Navin Jones DO, 3 mL at 02/04/24 0207    sodium chloride (PF) 0.9% PF flush 3 mL, 3 mL, Intracatheter, q1 min prn, Navin Jones DO    sodium chloride 0.9 % infusion, , Intravenous, Continuous, Navin Jones, , Last Rate: 100 mL/hr at 02/04/24 0157, New Bag at 02/04/24 0157    sodium chloride 0.9% BOLUS 1,000 mL, 1,000 mL, Intravenous, Once, Navin Jones, DO, Last Rate: 500 mL/hr at 02/04/24 0236, 1,000 mL at 02/04/24 0236    ALLERGIES:  No Known Allergies    FAMILY HISTORY:  No family history on file.    SOCIAL HISTORY:   Social History     Socioeconomic History    Marital status:        PHYSICAL EXAM:    Vitals: /60 (BP Location: Left arm)   Pulse 66   Temp 98.9  F (37.2  C) (Oral)   Resp 16   Ht 1.702 m (5' 7\")   Wt 81.5 kg (179 lb 11.2 oz)   SpO2 91%   BMI 28.14 kg/m     General:. Alert and interactive, Laying in bed with eyes closed but will wake and answer questions appropriately.  HENT: " Oropharynx without erythema or exudates. MMM.  TMs clear bilaterally.  Eyes: Pupils mid-sized and equally reactive.   Neck: Full AROM.  No midline tenderness to palpation.  Cardiovascular: Regular rate and rhythm. Peripheral pulses 2+ bilaterally.  Chest/Pulmonary: Mildly tachypneic with mild increased work of breathing and some decreased breath sounds at bilateral bases. no wheezes or crackles. No chest wall tenderness or deformities.  Abdomen: Soft, nondistended. Nontender without guarding or rebound.  Back/Spine: No CVA or midline tenderness.  Extremities: Normal ROM of all major joints. No lower extremity edema.   Skin: Warm and dry. Normal skin color.   Neuro: Speech clear. CNs grossly intact. Moves all extremities appropriately. Strength and sensation grossly intact to all extremities.   Psych: Normal affect/mood, cooperative, memory appropriate.     LAB:  All pertinent labs reviewed and interpreted.  Labs Ordered and Resulted from Time of ED Arrival to Time of ED Departure   COMPREHENSIVE METABOLIC PANEL - Abnormal       Result Value    Sodium 139      Potassium 4.9      Carbon Dioxide (CO2) 22      Anion Gap 10      Urea Nitrogen 44.3 (*)     Creatinine 2.85 (*)     GFR Estimate 22 (*)     Calcium 8.8      Chloride 107      Glucose 178 (*)     Alkaline Phosphatase 25 (*)     AST 18      ALT 26      Protein Total 6.4      Albumin 3.5      Bilirubin Total 1.1     CBC WITH PLATELETS AND DIFFERENTIAL - Abnormal    WBC Count 12.5 (*)     RBC Count 3.32 (*)     Hemoglobin 10.7 (*)     Hematocrit 32.9 (*)     MCV 99      MCH 32.2      MCHC 32.5      RDW 12.8      Platelet Count 166      % Neutrophils 81      % Lymphocytes 7      % Monocytes 11      % Eosinophils 0      % Basophils 0      % Immature Granulocytes 1      NRBCs per 100 WBC 0      Absolute Neutrophils 10.1 (*)     Absolute Lymphocytes 0.9      Absolute Monocytes 1.4 (*)     Absolute Eosinophils 0.0      Absolute Basophils 0.0      Absolute Immature  Granulocytes 0.1      Absolute NRBCs 0.0     NT PROBNP INPATIENT - Abnormal    N terminal Pro BNP Inpatient 3,238 (*)    HEMOGLOBIN A1C - Abnormal    Hemoglobin A1C 7.6 (*)    GLUCOSE BY METER - Abnormal    GLUCOSE BY METER POCT 149 (*)    LACTIC ACID WHOLE BLOOD - Normal    Lactic Acid 1.1     PROCALCITONIN - Normal    Procalcitonin 0.40     D DIMER QUANTITATIVE - Normal    D-Dimer Quantitative 0.50     INFLUENZA A/B, RSV, & SARS-COV2 PCR - Normal    Influenza A PCR Negative      Influenza B PCR Negative      RSV PCR Negative      SARS CoV2 PCR Negative     ROUTINE UA WITH MICROSCOPIC REFLEX TO CULTURE   BASIC METABOLIC PANEL   GLUCOSE MONITOR NURSING POCT   GLUCOSE MONITOR NURSING POCT   BLOOD CULTURE   BLOOD CULTURE       RADIOLOGY:  CT Abdomen Pelvis w/o Contrast   Final Result   IMPRESSION:    1. 0.7 cm calculus in the proximal to mid left ureter, resulting in moderate obstruction. This calculus was in the more proximal left ureter at the ureteropelvic junction on the recent comparison study dated 02/01/2024. The degree of dilatation of the    left intrarenal collecting system is unchanged to slightly increased since the comparison study.   2. No other acute abnormality identified in the abdomen or pelvis.   3. A few nonobstructing bilateral renal calculi.      XR Chest Port 1 View   Final Result   IMPRESSION: Borderline enlarged cardiac silhouette. Lungs are clear. No pleural effusion or pneumothorax. Mild central pulmonary vascular congestion. Aortic atherosclerotic calcification. Sternotomy wires.                  I, Esvin Santana, am serving as a scribe to document services personally performed by Dr. Anastasia Diaz  based on my observation and the provider's statements to me. I, Anastasia Diaz MD attest that Esvin Santana is acting in a scribe capacity, has observed my performance of the services and has documented them in accordance with my direction.      Anastasia Diaz M.D.  Emergency Medicine  [facility]  [facility] EMERGENCY ROOM  [address] [id number]  [phone]  Dept: [phone]     Anastasia iDaz MD  02/04/24 0343

## 2024-02-05 ENCOUNTER — TELEPHONE (OUTPATIENT)
Dept: UROLOGY | Facility: CLINIC | Age: 79
End: 2024-02-05
Payer: MEDICARE

## 2024-02-05 LAB
ANION GAP SERPL CALCULATED.3IONS-SCNC: 11 MMOL/L (ref 7–15)
BUN SERPL-MCNC: 38.3 MG/DL (ref 8–23)
CALCIUM SERPL-MCNC: 8.6 MG/DL (ref 8.8–10.2)
CHLORIDE SERPL-SCNC: 109 MMOL/L (ref 98–107)
CREAT SERPL-MCNC: 2.35 MG/DL (ref 0.67–1.17)
DEPRECATED HCO3 PLAS-SCNC: 19 MMOL/L (ref 22–29)
EGFRCR SERPLBLD CKD-EPI 2021: 27 ML/MIN/1.73M2
GLUCOSE BLDC GLUCOMTR-MCNC: 120 MG/DL (ref 70–99)
GLUCOSE BLDC GLUCOMTR-MCNC: 141 MG/DL (ref 70–99)
GLUCOSE BLDC GLUCOMTR-MCNC: 143 MG/DL (ref 70–99)
GLUCOSE BLDC GLUCOMTR-MCNC: 247 MG/DL (ref 70–99)
GLUCOSE SERPL-MCNC: 171 MG/DL (ref 70–99)
POTASSIUM SERPL-SCNC: 4.9 MMOL/L (ref 3.4–5.3)
SODIUM SERPL-SCNC: 139 MMOL/L (ref 135–145)

## 2024-02-05 PROCEDURE — 258N000003 HC RX IP 258 OP 636: Performed by: EMERGENCY MEDICINE

## 2024-02-05 PROCEDURE — 99222 1ST HOSP IP/OBS MODERATE 55: CPT | Performed by: INTERNAL MEDICINE

## 2024-02-05 PROCEDURE — 36415 COLL VENOUS BLD VENIPUNCTURE: CPT | Performed by: EMERGENCY MEDICINE

## 2024-02-05 PROCEDURE — 250N000013 HC RX MED GY IP 250 OP 250 PS 637: Performed by: EMERGENCY MEDICINE

## 2024-02-05 PROCEDURE — 250N000011 HC RX IP 250 OP 636: Performed by: HOSPITALIST

## 2024-02-05 PROCEDURE — 99233 SBSQ HOSP IP/OBS HIGH 50: CPT | Performed by: EMERGENCY MEDICINE

## 2024-02-05 PROCEDURE — 80048 BASIC METABOLIC PNL TOTAL CA: CPT | Performed by: EMERGENCY MEDICINE

## 2024-02-05 PROCEDURE — 250N000011 HC RX IP 250 OP 636: Performed by: EMERGENCY MEDICINE

## 2024-02-05 PROCEDURE — 120N000001 HC R&B MED SURG/OB

## 2024-02-05 RX ADMIN — TAMSULOSIN HYDROCHLORIDE 0.4 MG: 0.4 CAPSULE ORAL at 21:40

## 2024-02-05 RX ADMIN — ENOXAPARIN SODIUM 30 MG: 30 INJECTION SUBCUTANEOUS at 21:40

## 2024-02-05 RX ADMIN — PIPERACILLIN AND TAZOBACTAM 3.38 G: 3; .375 INJECTION, POWDER, FOR SOLUTION INTRAVENOUS at 06:39

## 2024-02-05 RX ADMIN — METOPROLOL SUCCINATE 100 MG: 100 TABLET, EXTENDED RELEASE ORAL at 21:40

## 2024-02-05 RX ADMIN — AMLODIPINE BESYLATE 10 MG: 10 TABLET ORAL at 21:40

## 2024-02-05 RX ADMIN — PIPERACILLIN AND TAZOBACTAM 3.38 G: 3; .375 INJECTION, POWDER, FOR SOLUTION INTRAVENOUS at 14:39

## 2024-02-05 RX ADMIN — PIPERACILLIN AND TAZOBACTAM 3.38 G: 3; .375 INJECTION, POWDER, FOR SOLUTION INTRAVENOUS at 21:40

## 2024-02-05 RX ADMIN — ISOSORBIDE MONONITRATE 20 MG: 20 TABLET ORAL at 21:46

## 2024-02-05 RX ADMIN — VANCOMYCIN HYDROCHLORIDE 750 MG: 5 INJECTION, POWDER, LYOPHILIZED, FOR SOLUTION INTRAVENOUS at 12:31

## 2024-02-05 ASSESSMENT — ACTIVITIES OF DAILY LIVING (ADL)
ADLS_ACUITY_SCORE: 32
ADLS_ACUITY_SCORE: 32
ADLS_ACUITY_SCORE: 29
ADLS_ACUITY_SCORE: 32
ADLS_ACUITY_SCORE: 29
ADLS_ACUITY_SCORE: 32
ADLS_ACUITY_SCORE: 29

## 2024-02-05 NOTE — PLAN OF CARE
Problem: Adult Inpatient Plan of Care  Goal: Absence of Hospital-Acquired Illness or Injury  Intervention: Identify and Manage Fall Risk  Recent Flowsheet Documentation  Taken 2/5/2024 0500 by Sakina Swain RN  Safety Promotion/Fall Prevention: safety round/check completed  Taken 2/5/2024 0250 by Sakina Swain RN  Safety Promotion/Fall Prevention:   activity supervised   assistive device/personal items within reach   clutter free environment maintained   increased rounding and observation   increase visualization of patient   lighting adjusted   nonskid shoes/slippers when out of bed   patient and family education   room door open   room near nurse's station   safety round/check completed   supervised activity   toileting scheduled  Taken 2/5/2024 0130 by Sakina Swain RN  Safety Promotion/Fall Prevention:   activity supervised   assistive device/personal items within reach   clutter free environment maintained   increased rounding and observation   increase visualization of patient   lighting adjusted   nonskid shoes/slippers when out of bed   patient and family education   room door open   room near nurse's station   safety round/check completed   supervised activity   toileting scheduled     Problem: Adult Inpatient Plan of Care  Goal: Absence of Hospital-Acquired Illness or Injury  Intervention: Prevent Skin Injury  Recent Flowsheet Documentation  Taken 2/5/2024 0250 by Sakina Swain RN  Body Position: position changed independently  Taken 2/5/2024 0130 by Sakina Swain RN  Body Position: position changed independently  Taken 2/5/2024 0000 by Sakina Swain RN  Body Position: position changed independently     Problem: Adult Inpatient Plan of Care  Goal: Absence of Hospital-Acquired Illness or Injury  Intervention: Prevent Infection  Recent Flowsheet Documentation  Taken 2/5/2024 0130 by Sakina Swain RN  Infection Prevention:   hand hygiene promoted   single patient  room provided   Goal Outcome Evaluation:  A&OX4 but forgetful. VSS except occasionally tachy. On 4LPM O2 tover night but can be on RA in the AM. Pt on tele reading sinus arrhythmia tachy at 0000, Sinus arrhythmia at 0400, and Afib at 0800. Pt is voiding w/ red urine over night and callaway placed at 0930 w/ verbal order from Dr. Granados. Due to retention. Discharge pending TCU vs homecare.

## 2024-02-05 NOTE — CONSULTS
"  HEART CARE CONSULTATON NOTE        Assessment/Recommendations   Assessment:  1.  Acute on chronic heart failure exacerbation with preserved ejection fraction: Mild fluid overload and has received a lot of IV fluids over the past few days.  2.  Acute on chronic kidney disease slowly improving  3.  Left ureteral stone status post stent placement  4.  Coronary disease status post CABG    Plan:  1.  Will give a low-dose of Lasix today  2.  Continue to monitor kidney function closely  3.  Continue home cardiac medications  Clinically Significant Risk Factors                    # Hypertension: Noted on problem list         # DMII: A1C = 7.6 % (Ref range: <5.7 %) within past 6 months, PRESENT ON ADMISSION  # Overweight: Estimated body mass index is 28.31 kg/m  as calculated from the following:    Height as of this encounter: 1.702 m (5' 7\").    Weight as of this encounter: 82 kg (180 lb 12.4 oz)., PRESENT ON ADMISSION           Acute heart failure with preserved ejection fraction        Fluid overload, unspecified    Not present on admission    Not present on admission    Acute kidney failure, unspecified  CKD POA List: Stage 3 (unspecified if a or b) (GFR 30 - 59)    Not present on admission    Not present on admission         History of Present Illness/Subjective    HPI: Navin Teresa is a 78 year old male with history of coronary artery disease status post CABG, hypertension, diabetes mellitus type 2, chronic kidney disease admitted with left flank pain and nausea with ROMAN.  Underwent left ureteral stent placement yesterday.  Kidney function is gradually improving.  Was febrile overnight.  Cardiology asked to see patient due to his cardiac history.  No complaints of shortness of breath, chest pain.  BNP elevated on admission at 3238.  Currently still requiring 4 L of oxygen.    Echocardiogram 2/4/2024    1. Normal left ventricular size and systolic performance with a visually  estimated ejection fraction of " "55-60%.  2. There is mild distal septal & anteroseptal hypokinesis.  3. There is mild aortic valve sclerosis without significant stenosis.  4. Normal right ventricular size and systolic performance.  5. There is mild left atrial enlargement.  6. Right ventricular systolic pressure relative to right atrial pressure is  mildly increased. The pulmonary artery pressure is estimated to be 40 mmHg  plus right atrial pressure (IVC is not well-visualized).  ______________________________________________       Physical Examination  Review of Systems   VITALS: /60 (BP Location: Right arm)   Pulse 70   Temp 98.3  F (36.8  C) (Oral)   Resp 18   Ht 1.702 m (5' 7\")   Wt 82 kg (180 lb 12.4 oz)   SpO2 94%   BMI 28.31 kg/m    BMI: Body mass index is 28.31 kg/m .  Wt Readings from Last 3 Encounters:   02/05/24 82 kg (180 lb 12.4 oz)   02/01/24 73 kg (161 lb)   05/13/23 82.1 kg (181 lb)       Intake/Output Summary (Last 24 hours) at 2/5/2024 1029  Last data filed at 2/5/2024 0751  Gross per 24 hour   Intake 2413 ml   Output 1376 ml   Net 1037 ml     General Appearance:   no distress, normal body habitus   ENT/Mouth: membranes moist, no oral lesions or bleeding gums.      EYES:  no scleral icterus, normal conjunctivae   Neck: no carotid bruits or thyromegaly   Chest/Lungs:   lungs are clear to auscultation   Cardiovascular:   Regular. Normal first and second heart sounds with no murmur  no edema bilaterally    Abdomen:  bowel sounds are present   Extremities: no cyanosis or clubbing   Skin: no xanthelasma, warm.    Neurologic: normal  bilateral, no tremors     Psychiatric: alert and oriented x3, calm     Review Of Systems  Skin: negative  Eyes: negative  Ears/Nose/Throat: negative  Respiratory: No shortness of breath, dyspnea on exertion, cough, or hemoptysis  Cardiovascular: negative  Gastrointestinal: negative  Genitourinary: negative  Musculoskeletal: negative  Neurologic: negative  Psychiatric: " "negative  Hematologic/Lymphatic/Immunologic: negative  Endocrine: negative          Lab Results    Chemistry/lipid CBC Cardiac Enzymes/BNP/TSH/INR   No results for input(s): \"CHOL\", \"HDL\", \"LDL\", \"TRIG\", \"CHOLHDLRATIO\" in the last 89963 hours.  No results for input(s): \"LDL\" in the last 17808 hours.  Recent Labs   Lab Test 02/05/24  0632 02/05/24  0615   NA  --  139   POTASSIUM  --  4.9   CHLORIDE  --  109*   CO2  --  19*   * 171*   BUN  --  38.3*   CR  --  2.35*   GFRESTIMATED  --  27*   ORTEGA  --  8.6*     Recent Labs   Lab Test 02/05/24 0615 02/04/24  0518 02/03/24  2347   CR 2.35* 2.76* 2.85*     Recent Labs   Lab Test 02/03/24  2347   A1C 7.6*          Recent Labs   Lab Test 02/04/24 0518   WBC 10.8   HGB 10.3*   HCT 32.8*      *     Recent Labs   Lab Test 02/04/24  0518 02/03/24  2347 02/01/24  1547   HGB 10.3* 10.7* 11.8*    No results for input(s): \"TROPONINI\" in the last 76533 hours.  Recent Labs   Lab Test 02/03/24  2347   NTBNPI 3,238*     No results for input(s): \"TSH\" in the last 51082 hours.  No results for input(s): \"INR\" in the last 02304 hours.     Medical History  Surgical History Family History Social History   CAD status post CABG x 2  Hypertension  Diabetes mellitus type 2  LISE  CKD History reviewed. No pertinent surgical history.    No family history of premature heart disease   Social History     Socioeconomic History    Marital status:      Spouse name: Not on file    Number of children: Not on file    Years of education: Not on file    Highest education level: Not on file   Occupational History    Not on file   Tobacco Use    Smoking status: Not on file    Smokeless tobacco: Not on file   Substance and Sexual Activity    Alcohol use: Not on file    Drug use: Not on file    Sexual activity: Not on file   Other Topics Concern    Not on file   Social History Narrative    Not on file     Social Determinants of Health     Financial Resource Strain: Not on file   Food " Insecurity: Not on file   Transportation Needs: Not on file   Physical Activity: Not on file   Stress: Not on file   Social Connections: Not on file   Interpersonal Safety: Not on file   Housing Stability: Not on file         Medications  Allergies   No current outpatient medications on file.      No Known Allergies      Gaviota Beard MD

## 2024-02-05 NOTE — PLAN OF CARE
"Goal Outcome Evaluation:  Patient alert and oriented x4.  Patient forgetful at times, bed alarm on and reminded needs to call for help prior to activity.  Patient's VS: /62 (BP Location: Left arm, Patient Position: Semi-Maradiaga's, Cuff Size: Adult Regular)   Pulse 67   Temp 99.7  F (37.6  C) (Oral)   Resp 18   Ht 1.702 m (5' 7\")   Wt 81.5 kg (179 lb 11.2 oz)   SpO2 92%   BMI 28.14 kg/m  on 4L per nasal cannula.  Patient's lung sounds diminished, encouraged to use IS on rounds and deep breath.  Capnography in use.  Attempting to wean patient off oxygen.  Patient's temperature improving now 99s orally.  Blood cultures pending.  Patient remains on telemetry, SR currently.  Patient voiding smaller amounts of urine, monitoring post void residuals.  Patient's urine red, straining urine.  Patient up with 1 assist, gait belt to ambulate short distance to chair this evening, tolerating.  Patient now resting in bed.  Discharge home pending.                  "

## 2024-02-05 NOTE — TELEPHONE ENCOUNTER
[facility] Call Center    Phone Message    May a detailed message be left on voicemail: no     Reason for Call: Other: Patient's niece called to let the clinic know that he is in the hospital with kidney stones. They are wondering if they want him to still keep his appointment and what there next steps should be.     Action Taken: Other: [facility] [facility]    Travel Screening: Not Applicable

## 2024-02-05 NOTE — PLAN OF CARE
Goal Outcome Evaluation:    Afebrile. Grissom placed in a.m. Maroon urine output. Slightly lighter in color by afternoon. Pt. Denies pain or discomfort. Family present. Passing flatus. Prune juice given. 3 days since last BM. Will Monitor.

## 2024-02-05 NOTE — PROGRESS NOTES
[facility] MEDICINE PROGRESS NOTE      Summary: 78 year old male into [facility] on 2/4/2024 after presenting  To the ER with persistent left flank pain.  PMHx includes DM2, noninsulin,  Ckd, CAD    ER diagnostics showed left pyelonephritis due to an 8 mm stone  At the [facility] with early sepsis.  He was put on empiric antibiotics with  Zosyn.  Yesterday I added vancomycin due to his history of enterococcus.    Hospital course is marked by left ureter stent placement yesterday.  Post  Procedure he spiked a few temps.  He has a 4 liter oxygen need likely  Due to his sepsis and volume status.  He has an ROMAN.    Overnight he had 3 fevers with a t max of 101.1.  This morning he has  Tea colored urine though per report the color has improved.  On interview  He complains of dysuria.  He was up in the chair yesterday  With reasonable tolerance.  He is having issues with urinary  Retention.        Hospital day number 2    Problem list:     POD #2 left ureter stent placement: stone extraction after discharge  Sepsis due to left pyelonephritis due to 8 mm stone UPJ  3.   Acute hypoxic respiratory failure  4.  HTN: normally on losartan, hydrochlorothiazide (on hold),   Metoprolol 100 now (vs 200), norvasc 10 mg continuously  5.  DM2, noninsulin: metformin on hold, bolus insulin as ordered  6.  CAD with history of CABG:  echo yesterday with EF of 50-55%   Along with distal and septal hypokinesis; due to his current   Volume status will have cardiology on board; he needs   Diuresis   7. CKD: acute on chronic: baseline creatinine of 1.46; currently   At 2.35; slow improvement;  8.  Urinary retention:  callaway today due to retention;   9. BPH:  friable prostate during procedure; ? Element of prostatitis;   Continue with flomax  10.  Dvt prevention:  lovenox  11.  Disposition:  goal is to discharge home though wife currently   Is in a TCU        Aydee Granados MD  [facility]  [facility]  [facility]  Phone: #[phone]  Securely message with the Vocera Web Console (learn more here)  Text page via [employer] Paging/Directory     Interval History/Subjective:  feeling tired; awakens and interviews well      Physical Exam/Objective:  Temp:  [98.6  F (37  C)-101.1  F (38.4  C)] 98.6  F (37  C)  Pulse:  [66-89] 67  Resp:  [16-28] 17  BP: ()/(50-62) 124/57  SpO2:  [90 %-95 %] 92 %  Body mass index is 28.31 kg/m .    GENERAL:  Alert, appears comfortable, in no acute distress, appears stated age   HEAD:  Normocephalic, without obvious abnormality, atraumatic   EYES:  PERRL, conjunctiva/corneas clear, no scleral icterus, EOM's intact   NOSE: Nares normal, septum midline, mucosa normal, no drainage   THROAT: Lips, mucosa, and tongue normal; teeth and gums normal, mouth moist   NECK: Supple, symmetrical, trachea midline   BACK:   Symmetric, no curvature, ROM normal   LUNGS:   Clear to auscultation bilaterally, no rales, rhonchi, or wheezing, symmetric chest rise on inhalation, respirations unlabored   CHEST WALL:  No tenderness or deformity   HEART:  Regular rate and rhythm, S1 and S2 normal, no murmur, rub, or gallop    ABDOMEN:   Soft, non-tender, bowel sounds active all four quadrants, no masses, no organomegaly, no rebound or guarding   EXTREMITIES: Extremities normal, atraumatic, no cyanosis or edema    SKIN: Dry to touch, no exanthems in the visualized areas   NEURO: Alert, oriented x3, moves all four extremities freely   PSYCH: Cooperative, behavior is appropriate      Data reviewed today: I personally reviewed all new medications, labs, imaging/diagnostics reports over the past 24 hours. Pertinent findings include:    Imaging:   Recent Results (from the past 24 hour(s))   XR Surgery BHUPENDRA  Fluoro G/T 5 Min    Narrative    This exam was marked as non-reportable because it will not be read by a   radiologist or a [place] non-radiologist provider.             Labs:      Medications:    Personally Reviewed.  Medications    sodium chloride 100 mL/hr at 02/04/24 1921      amLODIPine  10 mg Oral QPM    enoxaparin ANTICOAGULANT  30 mg Subcutaneous Q24H    famotidine  20 mg Oral Q48H    isosorbide mononitrate  20 mg Oral QPM    metoprolol succinate ER  100 mg Oral QPM    piperacillin-tazobactam  3.375 g Intravenous Q8H    [Held by provider] rosuvastatin  40 mg Oral QPM    sodium chloride (PF)  3 mL Intracatheter Q8H    tamsulosin  0.4 mg Oral QPM    vancomycin  750 mg Intravenous Q24H

## 2024-02-05 NOTE — H&P (VIEW-ONLY)
[facility] MEDICINE PROGRESS NOTE      Summary: 78 year old male into [facility] on 2/4/2024 after presenting  To the ER with persistent left flank pain.  PMHx includes DM2, noninsulin,  Ckd, CAD    ER diagnostics showed left pyelonephritis due to an 8 mm stone  At the [facility] with early sepsis.  He was put on empiric antibiotics with  Zosyn.  Yesterday I added vancomycin due to his history of enterococcus.    Hospital course is marked by left ureter stent placement yesterday.  Post  Procedure he spiked a few temps.  He has a 4 liter oxygen need likely  Due to his sepsis and volume status.  He has an ROMAN.    Overnight he had 3 fevers with a t max of 101.1.  This morning he has  Tea colored urine though per report the color has improved.  On interview  He complains of dysuria.  He was up in the chair yesterday  With reasonable tolerance.  He is having issues with urinary  Retention.        Hospital day number 2    Problem list:     POD #2 left ureter stent placement: stone extraction after discharge  Sepsis due to left pyelonephritis due to 8 mm stone [facility]  3.   Acute hypoxic respiratory failure  4.  HTN: normally on losartan, hydrochlorothiazide (on hold),   Metoprolol 100 now (vs 200), norvasc 10 mg continuously  5.  DM2, noninsulin: metformin on hold, bolus insulin as ordered  6.  CAD with history of CABG:  echo yesterday with EF of 50-55%   Along with distal and septal hypokinesis; due to his current   Volume status will have cardiology on board; he needs   Diuresis   7. CKD: acute on chronic: baseline creatinine of 1.46; currently   At 2.35; slow improvement;  8.  Urinary retention:  callaway today due to retention;   9. BPH:  friable prostate during procedure; ? Element of prostatitis;   Continue with flomax  10.  Dvt prevention:  lovenox  11.  Disposition:  goal is to discharge home though wife currently   Is in a TCU        Aydee Granados MD  [facility] Medicine  [facility]  [facility]  Phone: #[phone]  Securely message with the Vocera Web Console (learn more here)  Text page via [employer] Paging/Directory     Interval History/Subjective:  feeling tired; awakens and interviews well      Physical Exam/Objective:  Temp:  [98.6  F (37  C)-101.1  F (38.4  C)] 98.6  F (37  C)  Pulse:  [66-89] 67  Resp:  [16-28] 17  BP: ()/(50-62) 124/57  SpO2:  [90 %-95 %] 92 %  Body mass index is 28.31 kg/m .    GENERAL:  Alert, appears comfortable, in no acute distress, appears stated age   HEAD:  Normocephalic, without obvious abnormality, atraumatic   EYES:  PERRL, conjunctiva/corneas clear, no scleral icterus, EOM's intact   NOSE: Nares normal, septum midline, mucosa normal, no drainage   THROAT: Lips, mucosa, and tongue normal; teeth and gums normal, mouth moist   NECK: Supple, symmetrical, trachea midline   BACK:   Symmetric, no curvature, ROM normal   LUNGS:   Clear to auscultation bilaterally, no rales, rhonchi, or wheezing, symmetric chest rise on inhalation, respirations unlabored   CHEST WALL:  No tenderness or deformity   HEART:  Regular rate and rhythm, S1 and S2 normal, no murmur, rub, or gallop    ABDOMEN:   Soft, non-tender, bowel sounds active all four quadrants, no masses, no organomegaly, no rebound or guarding   EXTREMITIES: Extremities normal, atraumatic, no cyanosis or edema    SKIN: Dry to touch, no exanthems in the visualized areas   NEURO: Alert, oriented x3, moves all four extremities freely   PSYCH: Cooperative, behavior is appropriate      Data reviewed today: I personally reviewed all new medications, labs, imaging/diagnostics reports over the past 24 hours. Pertinent findings include:    Imaging:   Recent Results (from the past 24 hour(s))   XR Surgery BHUPENDRA  Fluoro G/T 5 Min    Narrative    This exam was marked as non-reportable because it will not be read by a   radiologist or a [place] non-radiologist provider.             Labs:      Medications:    Personally Reviewed.  Medications    sodium chloride 100 mL/hr at 02/04/24 1921      amLODIPine  10 mg Oral QPM    enoxaparin ANTICOAGULANT  30 mg Subcutaneous Q24H    famotidine  20 mg Oral Q48H    isosorbide mononitrate  20 mg Oral QPM    metoprolol succinate ER  100 mg Oral QPM    piperacillin-tazobactam  3.375 g Intravenous Q8H    [Held by provider] rosuvastatin  40 mg Oral QPM    sodium chloride (PF)  3 mL Intracatheter Q8H    tamsulosin  0.4 mg Oral QPM    vancomycin  750 mg Intravenous Q24H

## 2024-02-05 NOTE — TELEPHONE ENCOUNTER
Call placed to notified that Dr Hernandez can be alerted to his inpatient status at this time  became disconnected- RN returned call and advised Video visit will not take place for ins coverage reasons while pt is followed by Urology service and consult in patient.  Dr Hernandez will be alerted to this hospitalization and can connect with Urology Service if needed to coordinated outpatient follow up if needed.  Lisa HUYNH   [facility]   n/a

## 2024-02-06 ENCOUNTER — TELEPHONE (OUTPATIENT)
Dept: SURGERY | Facility: CLINIC | Age: 79
End: 2024-02-06

## 2024-02-06 VITALS
BODY MASS INDEX: 28.37 KG/M2 | HEART RATE: 94 BPM | HEIGHT: 67 IN | DIASTOLIC BLOOD PRESSURE: 69 MMHG | SYSTOLIC BLOOD PRESSURE: 129 MMHG | OXYGEN SATURATION: 97 % | WEIGHT: 180.78 LBS | RESPIRATION RATE: 16 BRPM | TEMPERATURE: 97.9 F

## 2024-02-06 DIAGNOSIS — N20.0 NEPHROLITHIASIS: Primary | ICD-10-CM

## 2024-02-06 LAB
ANION GAP SERPL CALCULATED.3IONS-SCNC: 10 MMOL/L (ref 7–15)
BACTERIA UR CULT: NO GROWTH
BUN SERPL-MCNC: 28.4 MG/DL (ref 8–23)
CALCIUM SERPL-MCNC: 9.1 MG/DL (ref 8.8–10.2)
CHLORIDE SERPL-SCNC: 110 MMOL/L (ref 98–107)
CREAT SERPL-MCNC: 1.6 MG/DL (ref 0.67–1.17)
DEPRECATED HCO3 PLAS-SCNC: 21 MMOL/L (ref 22–29)
EGFRCR SERPLBLD CKD-EPI 2021: 44 ML/MIN/1.73M2
GLUCOSE BLDC GLUCOMTR-MCNC: 131 MG/DL (ref 70–99)
GLUCOSE BLDC GLUCOMTR-MCNC: 162 MG/DL (ref 70–99)
GLUCOSE SERPL-MCNC: 151 MG/DL (ref 70–99)
POTASSIUM SERPL-SCNC: 4.4 MMOL/L (ref 3.4–5.3)
SODIUM SERPL-SCNC: 141 MMOL/L (ref 135–145)
VANCOMYCIN SERPL-MCNC: 10.1 UG/ML

## 2024-02-06 PROCEDURE — 250N000013 HC RX MED GY IP 250 OP 250 PS 637: Performed by: EMERGENCY MEDICINE

## 2024-02-06 PROCEDURE — 36415 COLL VENOUS BLD VENIPUNCTURE: CPT | Performed by: EMERGENCY MEDICINE

## 2024-02-06 PROCEDURE — 80202 ASSAY OF VANCOMYCIN: CPT | Performed by: EMERGENCY MEDICINE

## 2024-02-06 PROCEDURE — 250N000011 HC RX IP 250 OP 636: Performed by: HOSPITALIST

## 2024-02-06 PROCEDURE — 80048 BASIC METABOLIC PNL TOTAL CA: CPT | Performed by: EMERGENCY MEDICINE

## 2024-02-06 PROCEDURE — 99231 SBSQ HOSP IP/OBS SF/LOW 25: CPT | Performed by: INTERNAL MEDICINE

## 2024-02-06 PROCEDURE — 99239 HOSP IP/OBS DSCHRG MGMT >30: CPT | Performed by: FAMILY MEDICINE

## 2024-02-06 RX ORDER — POLYETHYLENE GLYCOL 3350 17 G/17G
1 POWDER, FOR SOLUTION ORAL DAILY
Qty: 7 PACKET | Refills: 0 | Status: SHIPPED | OUTPATIENT
Start: 2024-02-06 | End: 2024-02-13

## 2024-02-06 RX ORDER — ONDANSETRON 4 MG/1
4 TABLET, ORALLY DISINTEGRATING ORAL EVERY 6 HOURS PRN
Qty: 12 TABLET | Refills: 0 | Status: SHIPPED | OUTPATIENT
Start: 2024-02-06

## 2024-02-06 RX ORDER — OXYCODONE HYDROCHLORIDE 5 MG/1
5 TABLET ORAL EVERY 4 HOURS PRN
Qty: 12 TABLET | Refills: 0 | Status: SHIPPED | OUTPATIENT
Start: 2024-02-06 | End: 2024-02-19

## 2024-02-06 RX ORDER — DEXTROSE MONOHYDRATE 25 G/50ML
25-50 INJECTION, SOLUTION INTRAVENOUS
Status: DISCONTINUED | OUTPATIENT
Start: 2024-02-06 | End: 2024-02-06 | Stop reason: HOSPADM

## 2024-02-06 RX ORDER — ACETAMINOPHEN 325 MG/1
650 TABLET ORAL EVERY 6 HOURS PRN
COMMUNITY
Start: 2024-02-06

## 2024-02-06 RX ORDER — ENOXAPARIN SODIUM 100 MG/ML
40 INJECTION SUBCUTANEOUS EVERY 24 HOURS
Status: DISCONTINUED | OUTPATIENT
Start: 2024-02-06 | End: 2024-02-06 | Stop reason: HOSPADM

## 2024-02-06 RX ORDER — LEVOFLOXACIN 500 MG/1
500 TABLET, FILM COATED ORAL DAILY
Qty: 2 TABLET | Refills: 0 | Status: SHIPPED | OUTPATIENT
Start: 2024-02-06 | End: 2024-02-06

## 2024-02-06 RX ORDER — FAMOTIDINE 20 MG/1
20 TABLET, FILM COATED ORAL DAILY
Status: DISCONTINUED | OUTPATIENT
Start: 2024-02-07 | End: 2024-02-06 | Stop reason: HOSPADM

## 2024-02-06 RX ORDER — NICOTINE POLACRILEX 4 MG
15-30 LOZENGE BUCCAL
Status: DISCONTINUED | OUTPATIENT
Start: 2024-02-06 | End: 2024-02-06 | Stop reason: HOSPADM

## 2024-02-06 RX ORDER — LOSARTAN POTASSIUM AND HYDROCHLOROTHIAZIDE 12.5; 1 MG/1; MG/1
1 TABLET ORAL EVERY EVENING
COMMUNITY
Start: 2024-02-06

## 2024-02-06 RX ORDER — AMOXICILLIN AND CLAVULANATE POTASSIUM 500; 125 MG/1; MG/1
1 TABLET, FILM COATED ORAL 2 TIMES DAILY
Qty: 10 TABLET | Refills: 0 | Status: SHIPPED | OUTPATIENT
Start: 2024-02-06

## 2024-02-06 RX ADMIN — PIPERACILLIN AND TAZOBACTAM 3.38 G: 3; .375 INJECTION, POWDER, FOR SOLUTION INTRAVENOUS at 06:49

## 2024-02-06 RX ADMIN — FAMOTIDINE 20 MG: 20 TABLET ORAL at 10:17

## 2024-02-06 ASSESSMENT — ACTIVITIES OF DAILY LIVING (ADL)
ADLS_ACUITY_SCORE: 32

## 2024-02-06 NOTE — PLAN OF CARE
Discharge AVS reviewed with patient and family member. Questions answered and instructions acknowledged. Belongings and paperwork sent with via family  transport.   Grissom instructions completed with patient. Instructions printed and sent with patient.     Viral Beard RN on 2/6/2024 at 4:57 PM

## 2024-02-06 NOTE — TELEPHONE ENCOUNTER
Uday Hernandez MD     I spoke to pt melvin on phone and did consult and we will schedule him for surgery to treat the stone

## 2024-02-06 NOTE — PLAN OF CARE
Problem: Risk for Delirium  Goal: Improved Attention and Thought Clarity  Outcome: Progressing  Intervention: Maximize Cognitive Function  Recent Flowsheet Documentation  Taken 2/5/2024 2000 by Viral Beard RN  Sensory Stimulation Regulation:   care clustered   lighting decreased  Reorientation Measures: clock in view  Taken 2/5/2024 1730 by Viral Beard RN  Sensory Stimulation Regulation:   care clustered   lighting decreased  Reorientation Measures: clock in view     Problem: Adult Inpatient Plan of Care  Goal: Absence of Hospital-Acquired Illness or Injury  Outcome: Progressing  Intervention: Identify and Manage Fall Risk  Recent Flowsheet Documentation  Taken 2/5/2024 2000 by Viral Beard RN  Safety Promotion/Fall Prevention:   supervised activity   safety round/check completed   room organization consistent   room near nurse's station   patient and family education   nonskid shoes/slippers when out of bed   mobility aid in reach   lighting adjusted   clutter free environment maintained  Taken 2/5/2024 1730 by Viral Beard RN  Safety Promotion/Fall Prevention:   supervised activity   safety round/check completed   room organization consistent   room near nurse's station   patient and family education   nonskid shoes/slippers when out of bed   mobility aid in reach   lighting adjusted   clutter free environment maintained  Intervention: Prevent and Manage VTE (Venous Thromboembolism) Risk  Recent Flowsheet Documentation  Taken 2/5/2024 2000 by Viral Beard RN  VTE Prevention/Management: SCDs (sequential compression devices) off  Taken 2/5/2024 1730 by Viral Beard RN  VTE Prevention/Management: SCDs (sequential compression devices) off  Intervention: Prevent Infection  Recent Flowsheet Documentation  Taken 2/5/2024 2000 by Viral Beard RN  Infection Prevention:   cohorting utilized   environmental surveillance performed    equipment surfaces disinfected   personal protective equipment utilized   hand hygiene promoted   rest/sleep promoted   single patient room provided  Taken 2/5/2024 1730 by Viral Beard, RN  Infection Prevention:   cohorting utilized   environmental surveillance performed   equipment surfaces disinfected   personal protective equipment utilized   hand hygiene promoted   rest/sleep promoted   single patient room provided    TELE monitoring continued Sinus rhythm and sinus dysrhythmia noted.  ACHS checks continued. Pt did not want dinner and dinner-time insulin correction not administered. 60gCHO Diet continued.  A&O but forgetful. Pt slept most of shift. Grissom in place, output is red, output strained.   Pt has been tolerating room air      Viral Beard, RN on 2/5/2024 at 11:05 PM

## 2024-02-06 NOTE — PROGRESS NOTES
A&OX4 but forgetful. VSS on RA. Pt on tele reading NSR. Grissom in place due to retention w/ tea colored output. Discharge pending TCU vs homecare.

## 2024-02-06 NOTE — TELEPHONE ENCOUNTER
I spoke to melvin on phone  Pt had a left proximal stone and came to ER in pain and had stent placed   He had follow-up with me today but since admitted cannot do   I spoke on phone with melvin and discussed plan to treat stone  He works still at [email or web address] and very active  He wants Monday's if possible that's his day off  I think can do at [place] with Dr Brumfield in 2 weeks from now

## 2024-02-06 NOTE — PROGRESS NOTES
"  HEART CARE ENCOUNTER CONSULTATON NOTE      [facility]  [phone]      Assessment/Recommendations   Assessment:  1.  Acute on chronic kidney disease: Continuing to improve  2.  Left ureteral stone status post stent placement  3.  Coronary disease status post CABG without anginal complaints     Plan:  Continue home cardiac medications.  No further cardiac recommendations call for any further questions or concerns         History of Present Illness/Subjective    Patient doing quite well.  Normal O2 sats on room air.  Ready for discharge likely today       Physical Examination  Review of Systems   Vitals: /53 (BP Location: Left arm)   Pulse 95   Temp 98  F (36.7  C) (Oral)   Resp 18   Ht 1.702 m (5' 7\")   Wt 82 kg (180 lb 12.4 oz)   SpO2 96%   BMI 28.31 kg/m    BMI= Body mass index is 28.31 kg/m .  Wt Readings from Last 3 Encounters:   02/05/24 82 kg (180 lb 12.4 oz)   02/01/24 73 kg (161 lb)   05/13/23 82.1 kg (181 lb)       General Appearance:   no distress, normal body habitus   ENT/Mouth: membranes moist, no oral lesions or bleeding gums.      EYES:  no scleral icterus, normal conjunctivae       Chest/Lungs:   lungs are clear to auscultation,   Cardiovascular:   Regular       Extremities: no cyanosis or clubbing   Skin: no xanthelasma, warm.    Neurologic: normal  bilateral, no tremors     Psychiatric: alert and oriented x3, calm        Please refer above for cardiac ROS details.        Medical History  Surgical History Family History Social History   History reviewed. No pertinent past medical history.  Past Surgical History:   Procedure Laterality Date    COMBINED CYSTOSCOPY, RETROGRADES, URETEROSCOPY, INSERT STENT Left 2/4/2024    Procedure: CYSTOURETEROSCOPY, WITH LEFT RETROGRADE PYELOGRAM AND LEFT URETERAL STENT INSERTION;  Surgeon: Bandar Haley MD;  Location: [facility] Main OR     No family history on file.     Social History     Socioeconomic History    Marital " "status:      Spouse name: Not on file    Number of children: Not on file    Years of education: Not on file    Highest education level: Not on file   Occupational History    Not on file   Tobacco Use    Smoking status: Not on file    Smokeless tobacco: Not on file   Substance and Sexual Activity    Alcohol use: Not on file    Drug use: Not on file    Sexual activity: Not on file   Other Topics Concern    Not on file   Social History Narrative    Not on file     Social Determinants of Health     Financial Resource Strain: Not on file   Food Insecurity: Not on file   Transportation Needs: Not on file   Physical Activity: Not on file   Stress: Not on file   Social Connections: Not on file   Interpersonal Safety: Not on file   Housing Stability: Not on file           Medications  Allergies   No current outpatient medications on file.     No Known Allergies       Lab Results    Chemistry/lipid CBC Cardiac Enzymes/BNP/TSH/INR   No results for input(s): \"CHOL\", \"HDL\", \"LDL\", \"TRIG\", \"CHOLHDLRATIO\" in the last 95765 hours.  No results for input(s): \"LDL\" in the last 32533 hours.  Recent Labs   Lab Test 02/06/24  0648 02/06/24  0637   NA  --  141   POTASSIUM  --  4.4   CHLORIDE  --  110*   CO2  --  21*   * 151*   BUN  --  28.4*   CR  --  1.60*   GFRESTIMATED  --  44*   ORTEGA  --  9.1     Recent Labs   Lab Test 02/06/24  0637 02/05/24  0615 02/04/24  0518   CR 1.60* 2.35* 2.76*     Recent Labs   Lab Test 02/03/24  2347   A1C 7.6*          Recent Labs   Lab Test 02/04/24 0518   WBC 10.8   HGB 10.3*   HCT 32.8*      *     Recent Labs   Lab Test 02/04/24  0518 02/03/24  2347 02/01/24  1547   HGB 10.3* 10.7* 11.8*    No results for input(s): \"TROPONINI\" in the last 46013 hours.  Recent Labs   Lab Test 02/03/24  2347   NTBNPI 3,238*     No results for input(s): \"TSH\" in the last 52534 hours.  No results for input(s): \"INR\" in the last 18444 hours.     Gaviota Beard MD                                  "

## 2024-02-06 NOTE — DISCHARGE SUMMARY
[facility] MEDICINE  DISCHARGE SUMMARY     Primary Care Physician: Paolo Joyce  Admission Date: 2/3/2024   Discharge Provider: Heather Chandra MD Discharge Date: 2/6/2024   Diet:   Diabetic diet   Code Status: Full Code   Activity: DCACTIVITY: Activity as tolerated        Condition at Discharge: Stable     REASON FOR PRESENTATION(See Admission Note for Details)   Left flank pain    PRINCIPAL & ACTIVE DISCHARGE DIAGNOSES     8 mm right UPJ stone  Status post right ureteral stent placement  Presumed left pyelonephritis  BPH with urinary retention  Grissom catheter placed  Essential hypertension  DM2  Coronary artery disease with coronary artery bypass graft  CKD 3      PENDING LABS     Unresulted Labs Ordered in the Past 30 Days of this Admission       Date and Time Order Name Status Description    2/4/2024  5:52 PM Blood Culture Peripheral Blood Preliminary     2/4/2024  5:52 PM Blood Culture Peripheral Blood Preliminary     2/3/2024 11:25 PM Blood Culture Peripheral Blood Preliminary     2/3/2024 11:25 PM Blood Culture Peripheral Blood Preliminary           PROCEDURES ( this hospitalization only)      Procedure(s):  CYSTOURETEROSCOPY, WITH LEFT RETROGRADE PYELOGRAM AND LEFT URETERAL STENT INSERTION    RECOMMENDATIONS TO OUTPATIENT PROVIDER FOR F/U VISIT     Follow-up Appointments     Follow-up and recommended labs and tests       Follow-up with primary MD in 1 week  Follow-up with urology for stent removal in 10 to 14 days  CBC and BMP in 1 week            DISPOSITION     Home    SUMMARY OF HOSPITAL COURSE:      Ms. Navin Teresa is a 78 years old gentleman with history of DM2, essential hypertension, coronary artery disease with CABG, CKD 3, BPH, kidney stone passed spontaneously came with left flank pain found to have 8 mm left UPJ stone.  Patient had emergent left ureteral stent placement.  Urine and blood culture have no growth.  Status post IV Zosyn in the hospital, will  resume Augmentin to complete 2 weeks course.  History of BPH with recent urinary retention.  Grissom catheter placed with clear urine.  Will resume Flomax 0.4 mg daily.  Follow-up with urology in 10 to 14 days for definitive stone extraction and stent removal and void trial.  Remains hemodynamically stable.    History of coronary artery disease with CABG.  Echocardiogram revealed EF 50 to 55%.  Has volume load without acute pulmonary congestion.  Diuresed well with a single dose of Lasix.  Evaluated by cardiology.  No new recommendations.  Resume statin, beta-blocker.    Blood pressure is stable.  Resume metoprolol 200 mg daily, Norvasc 10 mg daily.  Will restart losartan and hydrochlorothiazide 100/12.5 mg in a week.    Discharge Medications with Med changes:     Current Discharge Medication List        START taking these medications    Details   amoxicillin-clavulanate (AUGMENTIN) 500-125 MG tablet Take 1 tablet by mouth 2 times daily  Qty: 10 tablet, Refills: 0    Associated Diagnoses: Kidney stone; Pyelonephritis      polyethylene glycol (MIRALAX) 17 g packet Take 17 g by mouth daily for 7 days  Qty: 7 packet, Refills: 0    Associated Diagnoses: Kidney stone           CONTINUE these medications which have CHANGED    Details   acetaminophen (TYLENOL) 325 MG tablet Take 2 tablets (650 mg) by mouth every 6 hours as needed for mild pain    Associated Diagnoses: Kidney stone      losartan-hydrochlorothiazide (HYZAAR) 100-12.5 MG tablet Take 1 tablet by mouth every evening To be started in 1 week      ondansetron (ZOFRAN ODT) 4 MG ODT tab Take 1 tablet (4 mg) by mouth every 6 hours as needed for nausea  Qty: 12 tablet, Refills: 0    Associated Diagnoses: Kidney stone      oxyCODONE (ROXICODONE) 5 MG tablet Take 1 tablet (5 mg) by mouth every 4 hours as needed for severe pain If pain is not improved with acetaminophen and ibuprofen.  Qty: 12 tablet, Refills: 0    Associated Diagnoses: Kidney stone           CONTINUE  these medications which have NOT CHANGED    Details   amLODIPine (NORVASC) 10 MG tablet Take 10 mg by mouth every evening      ezetimibe (ZETIA) 10 MG tablet Take 10 mg by mouth every evening      famotidine (PEPCID) 20 MG tablet Take 20 mg by mouth 2 times daily      ferrous sulfate (FEROSUL) 325 (65 Fe) MG tablet Take 325 mg by mouth every evening      isosorbide mononitrate (ISMO/MONOKET) 20 MG tablet Take 20 mg by mouth every evening      metFORMIN (GLUCOPHAGE XR) 500 MG 24 hr tablet Take 1,000 mg by mouth 2 times daily (with meals)      metoprolol succinate ER (TOPROL XL) 200 MG 24 hr tablet Take 200 mg by mouth every evening      nitroGLYcerin (NITROSTAT) 0.4 MG sublingual tablet Place 0.4 mg under the tongue every 5 minutes as needed for chest pain For chest pain place 1 tablet under the tongue every 5 minutes for 3 doses. If symptoms persist 5 minutes after 1st dose call 911.      rosuvastatin (CRESTOR) 40 MG tablet Take 40 mg by mouth every evening      tamsulosin (FLOMAX) 0.4 MG capsule Take 0.4 mg by mouth every evening           STOP taking these medications       dimenhyDRINATE (DRAMAMINE) 50 MG tablet Comments:   Reason for Stopping:                     Rationale for medication changes:      Augmentin to complete 2 weeks course  Tylenol and oxycodone for pain control        Consults   Urology  Cardiology    Immunizations given this encounter     Most Recent Immunizations   Administered Date(s) Administered    COVID-19 12+ (2023-24) (MODERNA) 10/26/2023    COVID-19 Bivalent 12+ (Pfizer) 10/15/2022    COVID-19 MONOVALENT 12+ (Pfizer) 10/19/2021    COVID-19 Monovalent 12+ (Pfizer 2022) 04/08/2022           Anticoagulation Information      None      SIGNIFICANT IMAGING FINDINGS     Results for orders placed or performed during the hospital encounter of 02/03/24   XR Chest Port 1 View    Impression    IMPRESSION: Borderline enlarged cardiac silhouette. Lungs are clear. No pleural effusion or pneumothorax.  Mild central pulmonary vascular congestion. Aortic atherosclerotic calcification. Sternotomy wires.   CT Abdomen Pelvis w/o Contrast    Impression    IMPRESSION:   1. 0.7 cm calculus in the proximal to mid left ureter, resulting in moderate obstruction. This calculus was in the more proximal left ureter at the ureteropelvic junction on the recent comparison study dated 02/01/2024. The degree of dilatation of the   left intrarenal collecting system is unchanged to slightly increased since the comparison study.  2. No other acute abnormality identified in the abdomen or pelvis.  3. A few nonobstructing bilateral renal calculi.       SIGNIFICANT LABORATORY FINDINGS   Creatinine 1.6, GFR 44  Urine and blood culture have no growth  Hemoglobin 10.3    Discharge Orders        Reason for your hospital stay    Left flank pain     Follow-up and recommended labs and tests     Follow-up with primary MD in 1 week  Follow-up with urology for stent removal in 10 to 14 days  CBC and BMP in 1 week     Activity    Your activity upon discharge: activity as tolerated     Diet    Follow this diet upon discharge: regular diet       Examination   Physical Exam   Temp:  [97.9  F (36.6  C)-99.7  F (37.6  C)] 98  F (36.7  C)  Pulse:  [69-95] 95  Resp:  [18] 18  BP: (112-149)/(53-65) 112/53  SpO2:  [92 %-97 %] 96 %  Wt Readings from Last 1 Encounters:   02/05/24 82 kg (180 lb 12.4 oz)     GENERAL:  Alert, appears comfortable, in no acute distress, appears stated age   HEAD:  Normocephalic, without obvious abnormality, atraumatic   EYES:  PERRL, conjunctiva clear,  EOM's intact   NOSE: Nares normal,  mucosa normal, no drainage   THROAT: Lips, mucosa,  gums normal, mouth moist   NECK: Supple, symmetrical, trachea midline   BACK:   Symmetric, no curvature, ROM normal   LUNGS:   Clear to auscultation bilaterally, no rales, rhonchi, or wheezing, symmetric chest rise on inhalation, respirations unlabored   CHEST WALL:  No tenderness or deformity    HEART:  Regular rate and rhythm, S1 and S2 normal, no murmur    ABDOMEN:   Soft, non-tender, bowel sounds active , no masses, no organomegaly, no rebound or guarding, callaway catheter in place with clear urine   EXTREMITIES: No   edema    SKIN: No rashes in the visualized areas   NEURO: Alert, oriented x 4, moves all four extremities freely/spontaneously   PSYCH: Cooperative, behavior is appropriate          Please see EMR for more detailed significant labs, imaging, consultant notes etc.    I, Heather Chandra MD, personally saw the patient today and spent greater than 30 minutes discharging this patient.    Heather Chandra MD  [facility]    CC:Paolo Joyce

## 2024-02-06 NOTE — PLAN OF CARE
A/O, can be forgetful. VSS on RA. Tele: Sinus dysrhythmia, HR controlled. Grissom in place, patent and draining red/tea colored output. Discharge pending urology.    Goal Outcome Evaluation:      Problem: Adult Inpatient Plan of Care  Goal: Optimal Comfort and Wellbeing  Outcome: Progressing     Problem: Infection  Goal: Absence of Infection Signs and Symptoms  Outcome: Progressing

## 2024-02-07 ENCOUNTER — TELEPHONE (OUTPATIENT)
Dept: UROLOGY | Facility: CLINIC | Age: 79
End: 2024-02-07
Payer: MEDICARE

## 2024-02-07 NOTE — TELEPHONE ENCOUNTER
Spoke with: Carla Alex       Date of surgery: Monday Feb 19 2024 with Dr Brumfield       Location: [facility]      Informed patient they will need a adult : YES      Pre op with provider: Patients melvin will schedule with cirilo Yoo PCP      H&P Scheduled in PAC- NA        Pre procedure covid : Not req       Additional imaging: NA         Surgery Packet : Sent via [employer] SMS      Additional comments: Please call for surgery teaching

## 2024-02-08 ENCOUNTER — PATIENT OUTREACH (OUTPATIENT)
Dept: CARE COORDINATION | Facility: CLINIC | Age: 79
End: 2024-02-08
Payer: MEDICARE

## 2024-02-08 NOTE — PROGRESS NOTES
Clinic Care Coordination Contact  [facility]/Voicemail    Clinical Data: Care Coordinator Outreach    Outreach Documentation Number of Outreach Attempt   2/8/2024   9:24 AM 2       Unable to leave a message on patient's voicemail with call back information Mailbox full.   Care Coordinator will do no further outreaches at this time.    Emelina Epperson  [phone]  Care

## 2024-02-09 ENCOUNTER — TELEPHONE (OUTPATIENT)
Dept: UROLOGY | Facility: CLINIC | Age: 79
End: 2024-02-09
Payer: MEDICARE

## 2024-02-09 LAB
BACTERIA BLD CULT: NO GROWTH

## 2024-02-09 NOTE — TELEPHONE ENCOUNTER
Patient caretaker Abi called stating that Navin has been complaining of urine leakage/bladder spasms that is creating urine leakage around catheter quite often making it very difficulty for patient to manage as he has to work as well.  Patient does have a ureteral stent in and awaiting next stone surgery on 2/19/24.  They are wondering if there is anything to help with the leakage issue.  Message was sent to provider and recommended patient to come in for nurse visit for a trial of void.  If patient fails TOV then patient will have a callaway catheter in until surgery .  Patient and Abi understands and agree with plan.

## 2024-02-12 ENCOUNTER — ALLIED HEALTH/NURSE VISIT (OUTPATIENT)
Dept: UROLOGY | Facility: CLINIC | Age: 79
End: 2024-02-12
Payer: MEDICARE

## 2024-02-12 DIAGNOSIS — N20.1 LEFT URETERAL CALCULUS: Primary | ICD-10-CM

## 2024-02-12 PROCEDURE — 99211 OFF/OP EST MAY X REQ PHY/QHP: CPT

## 2024-02-12 NOTE — PROGRESS NOTES
Patient presents to the office today for a trial of void per Dr. Hernandez due to bladder spasms resulting in urine leakage around the callaway catheter and urethra.  Trial of void education was reviewed with understanding.      With patient laying on exam bed, the leg bag was detached from the callaway catheter and discarded.  150cc of sterile water was then instilled into the bladder via callaway catheter.  Patient had a very strong urge to urinate.  The callaway cath balloon was then deflated fully and the catheter was removed without difficulty.  Patient was able to void 150cc of urine out.  Patient tolerated procedure well.  He will watch for any signs of urinary retention and call office or seek ER evaluation.  He has a ureteral stent in and awaiting ureteroscopy stone removal on 2/19/24.  Stent education was reviewed with patient as well.

## 2024-02-14 ENCOUNTER — ANESTHESIA EVENT (OUTPATIENT)
Dept: SURGERY | Facility: AMBULATORY SURGERY CENTER | Age: 79
End: 2024-02-14
Payer: MEDICARE

## 2024-02-19 ENCOUNTER — HOSPITAL ENCOUNTER (OUTPATIENT)
Facility: AMBULATORY SURGERY CENTER | Age: 79
Discharge: HOME | End: 2024-02-19
Attending: UROLOGY
Payer: MEDICARE

## 2024-02-19 ENCOUNTER — TELEPHONE (OUTPATIENT)
Dept: UROLOGY | Facility: CLINIC | Age: 79
End: 2024-02-19
Payer: MEDICARE

## 2024-02-19 ENCOUNTER — ANESTHESIA (OUTPATIENT)
Dept: SURGERY | Facility: AMBULATORY SURGERY CENTER | Age: 79
End: 2024-02-19
Payer: MEDICARE

## 2024-02-19 VITALS
BODY MASS INDEX: 27.13 KG/M2 | RESPIRATION RATE: 16 BRPM | TEMPERATURE: 97.6 F | HEIGHT: 68 IN | HEART RATE: 62 BPM | SYSTOLIC BLOOD PRESSURE: 139 MMHG | OXYGEN SATURATION: 96 % | DIASTOLIC BLOOD PRESSURE: 63 MMHG | WEIGHT: 179 LBS

## 2024-02-19 DIAGNOSIS — N20.0 KIDNEY STONE: ICD-10-CM

## 2024-02-19 DIAGNOSIS — N20.0 NEPHROLITHIASIS: ICD-10-CM

## 2024-02-19 LAB — GLUCOSE POCT: 184 MG/DL (ref 70–99)

## 2024-02-19 PROCEDURE — 99000 SPECIMEN HANDLING OFFICE-LAB: CPT | Performed by: INTERNAL MEDICINE

## 2024-02-19 PROCEDURE — 82365 CALCULUS SPECTROSCOPY: CPT | Mod: 90 | Performed by: INTERNAL MEDICINE

## 2024-02-19 DEVICE — STENT, URETERAL, 4.8FR X 26CM, HYDROPLUS COATING, WITHOUT WIRE, PERCUFLEX PLUS: Type: IMPLANTABLE DEVICE | Site: URETER | Status: FUNCTIONAL

## 2024-02-19 RX ORDER — ONDANSETRON 2 MG/ML
4 INJECTION INTRAMUSCULAR; INTRAVENOUS EVERY 30 MIN PRN
Status: DISCONTINUED | OUTPATIENT
Start: 2024-02-19 | End: 2024-02-20 | Stop reason: HOSPADM

## 2024-02-19 RX ORDER — ACETAMINOPHEN 325 MG/1
975 TABLET ORAL ONCE
Status: COMPLETED | OUTPATIENT
Start: 2024-02-19 | End: 2024-02-19

## 2024-02-19 RX ORDER — SODIUM CHLORIDE, SODIUM LACTATE, POTASSIUM CHLORIDE, CALCIUM CHLORIDE 600; 310; 30; 20 MG/100ML; MG/100ML; MG/100ML; MG/100ML
INJECTION, SOLUTION INTRAVENOUS CONTINUOUS
Status: DISCONTINUED | OUTPATIENT
Start: 2024-02-19 | End: 2024-02-20 | Stop reason: HOSPADM

## 2024-02-19 RX ORDER — DEXAMETHASONE SODIUM PHOSPHATE 4 MG/ML
INJECTION, SOLUTION INTRA-ARTICULAR; INTRALESIONAL; INTRAMUSCULAR; INTRAVENOUS; SOFT TISSUE PRN
Status: DISCONTINUED | OUTPATIENT
Start: 2024-02-19 | End: 2024-02-19

## 2024-02-19 RX ORDER — PROPOFOL 10 MG/ML
INJECTION, EMULSION INTRAVENOUS PRN
Status: DISCONTINUED | OUTPATIENT
Start: 2024-02-19 | End: 2024-02-19

## 2024-02-19 RX ORDER — LIDOCAINE HYDROCHLORIDE 20 MG/ML
JELLY TOPICAL PRN
Status: DISCONTINUED | OUTPATIENT
Start: 2024-02-19 | End: 2024-02-19 | Stop reason: HOSPADM

## 2024-02-19 RX ORDER — FENTANYL CITRATE 0.05 MG/ML
50 INJECTION, SOLUTION INTRAMUSCULAR; INTRAVENOUS EVERY 5 MIN PRN
Status: DISCONTINUED | OUTPATIENT
Start: 2024-02-19 | End: 2024-02-20 | Stop reason: HOSPADM

## 2024-02-19 RX ORDER — OXYCODONE HYDROCHLORIDE 5 MG/1
5 TABLET ORAL EVERY 6 HOURS PRN
Start: 2024-02-19

## 2024-02-19 RX ORDER — OXYCODONE HYDROCHLORIDE 5 MG/1
5 TABLET ORAL
Status: DISCONTINUED | OUTPATIENT
Start: 2024-02-19 | End: 2024-02-20 | Stop reason: HOSPADM

## 2024-02-19 RX ORDER — HYDROMORPHONE HCL IN WATER/PF 6 MG/30 ML
0.2 PATIENT CONTROLLED ANALGESIA SYRINGE INTRAVENOUS EVERY 5 MIN PRN
Status: DISCONTINUED | OUTPATIENT
Start: 2024-02-19 | End: 2024-02-20 | Stop reason: HOSPADM

## 2024-02-19 RX ORDER — ONDANSETRON 2 MG/ML
INJECTION INTRAMUSCULAR; INTRAVENOUS PRN
Status: DISCONTINUED | OUTPATIENT
Start: 2024-02-19 | End: 2024-02-19

## 2024-02-19 RX ORDER — HYDROMORPHONE HCL IN WATER/PF 6 MG/30 ML
0.4 PATIENT CONTROLLED ANALGESIA SYRINGE INTRAVENOUS EVERY 5 MIN PRN
Status: DISCONTINUED | OUTPATIENT
Start: 2024-02-19 | End: 2024-02-20 | Stop reason: HOSPADM

## 2024-02-19 RX ORDER — ONDANSETRON 4 MG/1
4 TABLET, ORALLY DISINTEGRATING ORAL EVERY 30 MIN PRN
Status: DISCONTINUED | OUTPATIENT
Start: 2024-02-19 | End: 2024-02-20 | Stop reason: HOSPADM

## 2024-02-19 RX ORDER — FENTANYL CITRATE 0.05 MG/ML
25 INJECTION, SOLUTION INTRAMUSCULAR; INTRAVENOUS EVERY 5 MIN PRN
Status: DISCONTINUED | OUTPATIENT
Start: 2024-02-19 | End: 2024-02-20 | Stop reason: HOSPADM

## 2024-02-19 RX ORDER — OXYCODONE HYDROCHLORIDE 10 MG/1
10 TABLET ORAL
Status: DISCONTINUED | OUTPATIENT
Start: 2024-02-19 | End: 2024-02-20 | Stop reason: HOSPADM

## 2024-02-19 RX ORDER — LIDOCAINE HYDROCHLORIDE 20 MG/ML
INJECTION, SOLUTION INFILTRATION; PERINEURAL PRN
Status: DISCONTINUED | OUTPATIENT
Start: 2024-02-19 | End: 2024-02-19

## 2024-02-19 RX ORDER — LIDOCAINE 40 MG/G
CREAM TOPICAL
Status: DISCONTINUED | OUTPATIENT
Start: 2024-02-19 | End: 2024-02-20 | Stop reason: HOSPADM

## 2024-02-19 RX ORDER — FENTANYL CITRATE 50 UG/ML
INJECTION, SOLUTION INTRAMUSCULAR; INTRAVENOUS PRN
Status: DISCONTINUED | OUTPATIENT
Start: 2024-02-19 | End: 2024-02-19

## 2024-02-19 RX ORDER — CEFAZOLIN SODIUM 2 G/100ML
2 INJECTION, SOLUTION INTRAVENOUS SEE ADMIN INSTRUCTIONS
Status: DISCONTINUED | OUTPATIENT
Start: 2024-02-19 | End: 2024-02-20 | Stop reason: HOSPADM

## 2024-02-19 RX ORDER — CEFAZOLIN SODIUM 2 G/100ML
2 INJECTION, SOLUTION INTRAVENOUS
Status: COMPLETED | OUTPATIENT
Start: 2024-02-19 | End: 2024-02-19

## 2024-02-19 RX ADMIN — PROPOFOL 200 MG: 10 INJECTION, EMULSION INTRAVENOUS at 09:26

## 2024-02-19 RX ADMIN — LIDOCAINE HYDROCHLORIDE 3 ML: 20 INJECTION, SOLUTION INFILTRATION; PERINEURAL at 09:26

## 2024-02-19 RX ADMIN — FENTANYL CITRATE 25 MCG: 50 INJECTION, SOLUTION INTRAMUSCULAR; INTRAVENOUS at 09:32

## 2024-02-19 RX ADMIN — ACETAMINOPHEN 975 MG: 325 TABLET ORAL at 08:04

## 2024-02-19 RX ADMIN — FENTANYL CITRATE 25 MCG: 50 INJECTION, SOLUTION INTRAMUSCULAR; INTRAVENOUS at 09:35

## 2024-02-19 RX ADMIN — DEXAMETHASONE SODIUM PHOSPHATE 4 MG: 4 INJECTION, SOLUTION INTRA-ARTICULAR; INTRALESIONAL; INTRAMUSCULAR; INTRAVENOUS; SOFT TISSUE at 09:26

## 2024-02-19 RX ADMIN — SODIUM CHLORIDE, SODIUM LACTATE, POTASSIUM CHLORIDE, CALCIUM CHLORIDE: 600; 310; 30; 20 INJECTION, SOLUTION INTRAVENOUS at 08:24

## 2024-02-19 RX ADMIN — FENTANYL CITRATE 25 MCG: 50 INJECTION, SOLUTION INTRAMUSCULAR; INTRAVENOUS at 09:41

## 2024-02-19 RX ADMIN — CEFAZOLIN SODIUM 2 G: 2 INJECTION, SOLUTION INTRAVENOUS at 09:27

## 2024-02-19 RX ADMIN — FENTANYL CITRATE 25 MCG: 50 INJECTION, SOLUTION INTRAMUSCULAR; INTRAVENOUS at 09:50

## 2024-02-19 RX ADMIN — ONDANSETRON 4 MG: 2 INJECTION INTRAMUSCULAR; INTRAVENOUS at 09:26

## 2024-02-19 NOTE — DISCHARGE INSTRUCTIONS
"  You have received 975 mg of Acetaminophen (Tylenol) at 0805am. Please do not take an additional dose of Tylenol until after 2:05pm.     Do not exceed 4,000 mg of acetaminophen during a 24 hour period and keep in mind that acetaminophen can also be found in many over-the-counter cold medications as well as narcotics that may be given for pain.         If you have any questions or concerns regarding your procedure, please contact Dr. Brumfield, his office number is [phone].         Post-Operative Symptom Control    While you recover from your procedure, you can take steps to ease your recovery.    Diet and Fluids:    Eating your normal diet is fine.  Drink a little more than normal but you don not have to \"flush\" your system.    Activity:    There are no activity restrictions after stone surgery.  Some people find bending twisting movements cause discomfort or increased blood in urine.  Activity may irritating but you will not hurt yourself.    Medications: (That may be suggested or prescribed)    Ibuprofen (Advil/Motrin)-Available over the counter.  Take 2 (200mg) tablets at bedtime and every six hour for base pain management.      Dramamine (brand name drowsy version)-Is available over the counter.  Take 50 mg at bedtime and every 6 hours as needed.  This medication can be helpful by:   Decreasing nausea   Decrease acute pain   Decrease recurrence of pain for 24 hours  *This medication my cause increased drowsiness, do not drive or operate machinery within 6 hours.    Flomax (Tamsulosin)-After surgery Flomax has several potential benefits   Decreased stent discomfort   Increased likelihood passage of small stone fragments   Take daily with food until your stent is removed  *This may cause nasal congestion or light-headedness    Narcotics (oxycodone)   Take as prescribed for severe pain   Narcotics have significant side effects and only \"cover-up\" pain.  They have no effect on cause of pain.     Common side " "effects  Confusion, disorientation and sedation-DO NOT DRIVE OR OPERATE MACHINERY WITH IN 24 HOURS OF TAKING NARCOTICS    Nausea-take Dramamine or Zofran to help control  Constipation  Sleep Disturbances    Call the Clinic Immediately If You Have Any Of The Following Symptoms:   Nausea/vomiting that is uncontrolled with medications   You have a fever over 100.0   Chills   Are not able to urinate for 8 hours    Increasing back pain that is not relieved with pain medications   Large amounts of blood in urine or large clots    We can respond to your questions or concerns 24 hours a day at [phone].   For after hours phone calls please wait on call to be connected with the \"care connection\" service for after hours care.     Going Home With a Ureteral Stent    What is it?  A stent is a soft, plastic tube that helps urine (pee) drain into the bladder.  During the surgery, it is placed in the ureter the tube that connects the kidney to the bladder.  A thin curl at each end of the stent keeps one end in your kidney and the other in your bladder.  The stent can not be seen from outside of the body.    Why Do I Have It?  Some sort of blockage is not letting pee drain into your bladder.  This could be from a stone, certain surgeries or kidney infection.    What Should I Expect?   Stents often cause some discomfort.  You may have:    The need to pee suddenly    Pain when you pee    A dull backache, which may get worse when you pee  Blood in your pee (color of fruit punch) and some clots, which may increase with physical activity.     What Can I Do To Feel Better?    Drink a little more fluids than usual.  You can eat your normal diet.    Enjoy a warm bath.  Decrease your activity.  Some people find bending or twisting movement cause discomfort or increased blood in the urine.  Even so, you will not harm yourself.    Your stent can be removed on Thursday February 22 by pulling on the string until you see both curls.  If the " stent is left in more than 3 months, it can lead to a need for procedure in order to remove it, permanent kidney damage or loss.    Follow up:  We will have you follow-up in clinic in 2 to 3 months after kidney renal ultrasound.     Please complete your lab testing and 24 hr urine testing around the same time, at least 2 weeks prior to your follow up appointment.     Discharge Instructions: After Your Surgery, regarding anesthesia    You ve just had surgery. During surgery, you were given medicine called anesthesia to keep you relaxed and free of pain. After surgery, you may have some pain or nausea. This is common. Here are some tips for feeling better and getting well after surgery.    Going home    Your healthcare provider will show you how to take care of yourself when you go home. He or she will also answer your questions. Have an adult family member or friend drive you home. For the first 24 hours after your surgery:    Don't drive or use heavy equipment.  Don't make important decisions or sign legal papers.  Don't drink alcohol.  Have an adult stay with you for the next 24 hours. He or she can watch for problems and help keep you safe.  Be sure to go to all follow-up visits with your healthcare provider. And rest after your surgery for as long as your healthcare provider tells you to.    Managing nausea    Some people have an upset stomach after surgery. This is often because of anesthesia, pain, or pain medicine, or the stress of surgery. These tips will help you handle nausea and eat healthy foods as you get better. If you were on a special food plan before surgery, ask your healthcare provider if you should follow it while you get better. These tips may help:    Don't push yourself to eat. Your body will tell you when to eat and how much.  Start off with clear liquids and soup. They are easier to digest.  Next try semi-solid foods, such as mashed potatoes, applesauce, and gelatin, as you feel  ready.  Slowly move to solid foods. Don t eat fatty, rich, or spicy foods at first.  Don't force yourself to have 3 large meals a day. Instead eat smaller amounts more often.  Take pain medicines with a small amount of solid food, such as crackers or toast, to prevent nausea.    If you have obstructive sleep apnea    You were given anesthesia medicine during surgery to keep you comfortable and free of pain. After surgery, you may have more apnea spells because of this medicine and other medicines you were given. The spells may last longer than usual.   At home:    Keep using the continuous positive airway pressure (CPAP) device when you sleep. Unless your healthcare provider tells you not to, use it when you sleep, day or night. CPAP is a common device used to treat obstructive sleep apnea.  Talk with your provider before taking any pain medicine, muscle relaxants, or sedatives. Your provider will tell you about the possible dangers of taking these medicines.

## 2024-02-19 NOTE — ANESTHESIA POSTPROCEDURE EVALUATION
Patient: Navin Teresa    Procedure: Procedure(s):  Cystoscopy, Left Ureteroscopy, Left Retrograde Pyelogram, Left Laser Lithotripsy, Stone Basketing, Left Ureteral Stent Replacement       Anesthesia Type:  General    Note:  Disposition: Outpatient   Postop Pain Control: Uneventful            Sign Out: Well controlled pain   PONV: No   Neuro/Psych: Uneventful            Sign Out: Acceptable/Baseline neuro status   Airway/Respiratory: Uneventful            Sign Out: Acceptable/Baseline resp. status   CV/Hemodynamics: Uneventful            Sign Out: Acceptable CV status; No obvious hypovolemia; No obvious fluid overload   Other NRE: NONE   DID A NON-ROUTINE EVENT OCCUR? No           Last vitals:  Vitals Value Taken Time   /70 02/19/24 1033   Temp 97.3  F (36.3  C) 02/19/24 1033   Pulse 60 02/19/24 1029   Resp 16 02/19/24 1033   SpO2 95 % 02/19/24 1033   Vitals shown include unfiled device data.    Electronically Signed By: Ellie Bonilla MD  February 19, 2024  11:03 AM

## 2024-02-19 NOTE — INTERVAL H&P NOTE
The History and Physical has been reviewed, the patient has been examined and no changes have occurred in the patient's condition since the H & P was completed.     We discussed the benefits and risks of left ureteroscopy, including but not limited to, bleeding, infection, need for stent/stent related symptoms, injury to ureter, need for second procedure if unable to reach stone or residual fragments.

## 2024-02-19 NOTE — OP NOTE
OPERATIVE REPORT    PREOPERATIVE DIAGNOSIS:  Left ureteral stone status post stent placement    POSTOPERATIVE DIAGNOSIS: Same    PROCEDURES PERFORMED:   Cystoscopy  Left retrograde pyelogram  Left ureteral stone manipulation  Left ureteroscopy with laser lithotripsy and stone basket extraction  Left ureteral stent exchange  Intraoperative interpretation fluoroscopic imaging less than an hour    STAFF SURGEON: Dylon Brumfield MD  ASSISTANT(S): None  ANESTHESIA: General  ESTIMATED BLOOD LOSS: 1 ml  COMPLICATIONS: None.   SPECIMEN: Left ureteral stone for analysis    SIGNIFICANT FINDINGS:   Large median lobe of the prostate  Mild left hydronephrosis  Left ureteral stone repositioned in the upper pole with the ureteroscope  Stone lasered and basketed  4.8 [place] citizen by 26 cm stent on string replaced    BRIEF OPERATIVE INDICATIONS: Navin Teersa is a(n) 79 year old male who presented with an obstructing left ureteral stone status post placement by Dr. Haley on 2/4/2024.  After a discussion of all risks, benefits, and alternatives, the patient elected to proceed with left ureteroscopy .    DESCRIPTION OF PROCEDURE:  After informed consent was obtained, the patient was transported to the operating room & placed supine on the table. After adequate anesthesia was induced, the patient was placed in lithotomy and prepped and draped in the usual sterile fashion. A timeout was taken to confirm correct patient, procedure and laterality. Pre-operative IV antibiotics were administered.     A 22 [place] citizen cystourethroscope inserted through the urethra and the bladder.  There are no urethral strictures and the prostate had trilobar outlet obstruction.  Within the bladder there is a large intravesical median lobe and the stent was seen just lateral to this.  I attempted to place a wire alongside the stent around the median lobe however this was not easily the performed so I elected to remove the stent under fluoroscopic guidance major  downfall of the ureter.  After the stent was externalized I placed a Bentson wire through this down to into the proximal collecting system without resistance.  I used a dual-lumen catheter was easily cannulated and traversed the length of the ureter into the proximal ureter and retrograde pyelogram demonstrated mild hydronephrosis.  I placed a second Bentson wire.      I placed a 11-13 Bulgarian by 46 cm ureteral access sheath to the proximal ureter over the working wire.  Using a Storz Flex XC digital ureteroscope, I found the stone within the proximal ureter just proximal to the access sheath tip.  I reposition the stone with the ureteroscope into the upper pole from the proximal ureter to allow for easier laser lithotripsy.  Using a 200  m thulium fiber laser, I broke the stone into a few pieces after dusting the majority of it.  All stones larger than 2 mm removed by basket extraction with a halo basket.  After confirming clearance of some other small stone fragments within the kidney, pullback ureteroscopy was unremarkable.    I replaced a 4.8 Bulgarian by 26 cm ureteral stent on modified single string tether.    They were awakened from anesthesia and transferred to the PACU.       POSTOP PLAN:  Patient remove stent Thursday, February 22  Follow-up with 24-hour urine study, renal ultrasound and follow-up visit

## 2024-02-19 NOTE — BRIEF OP NOTE
[facility] Brief Operative Note    Pre-operative diagnosis: Nephrolithiasis [N20.0]   Post-operative diagnosis left ureterolithiasis   Procedure: Procedure(s):  Cystoscopy, Left Ureteroscopy, Left Retrograde Pyelogram, Left Laser Lithotripsy, Stone Basketing, Left Ureteral Stent Replacement   Surgeon: Dylon Brumfield MD   Assistants(s): None   Estimated blood loss: 1 ml    Specimens: Left ureteral stone for analysis   Findings: Stone at left proximal ureter, respostioned to upper pole with ureteroscopy  Mild hydronephrosis  Stone lasered and basketed  4.8 fr x 26 cm stent ON string placed

## 2024-02-19 NOTE — ANESTHESIA CARE TRANSFER NOTE
Patient: Navin Teresa    Procedure: Procedure(s):  Cystoscopy, Left Ureteroscopy, Left Retrograde Pyelogram, Left Laser Lithotripsy, Stone Basketing, Left Ureteral Stent Replacement       Diagnosis: Nephrolithiasis [N20.0]  Diagnosis Additional Information: No value filed.    Anesthesia Type:   General     Note:    Oropharynx: oropharynx clear of all foreign objects and spontaneously breathing  Level of Consciousness: drowsy  Oxygen Supplementation: face mask  Level of Supplemental Oxygen (L/min / FiO2): 8  Independent Airway: airway patency satisfactory and stable  Dentition: dentition unchanged  Vital Signs Stable: post-procedure vital signs reviewed and stable  Report to RN Given: handoff report given  Patient transferred to: PACU    Handoff Report: Identifed the Patient, Identified the Reponsible Provider, Reviewed the pertinent medical history, Discussed the surgical course, Reviewed Intra-OP anesthesia mangement and issues during anesthesia, Set expectations for post-procedure period and Allowed opportunity for questions and acknowledgement of understanding      Vitals:  Vitals Value Taken Time   /60 02/19/24 1008   Temp 96.2  F (35.7  C) 02/19/24 1008   Pulse 55 02/19/24 1013   Resp 16 02/19/24 1008   SpO2 98 % 02/19/24 1013   Vitals shown include unfiled device data.    Electronically Signed By: RAISA Bedoya CRNA  February 19, 2024  10:16 AM

## 2024-02-19 NOTE — ANESTHESIA PREPROCEDURE EVALUATION
Anesthesia Pre-Procedure Evaluation    Patient: Navin Teresa   MRN: [id number] : 1945        Procedure : Procedure(s):  Cystoscopy, Left Ureteroscopy, Left Retrograde Pyelogram, Left Laser Lithotripsy, Possible Left Ureteral Stent Placement, Possible Left Ureteral Stent Removal          Past Medical History:   Diagnosis Date    Coronary artery disease     Diabetes (H)     Hypertension     Sleep apnea       Past Surgical History:   Procedure Laterality Date    ANGIOPLASTY      CARDIAC SURGERY      Quadruple bypass    COMBINED CYSTOSCOPY, RETROGRADES, URETEROSCOPY, INSERT STENT Left 2024    Procedure: CYSTOURETEROSCOPY, WITH LEFT RETROGRADE PYELOGRAM AND LEFT URETERAL STENT INSERTION;  Surgeon: Bandar Haley MD;  Location: [facility]    ENUCLEATION Left       No Known Allergies   Social History     Tobacco Use    Smoking status: Former     Types: Cigarettes     Quit date:      Years since quittin.1    Smokeless tobacco: Never   Substance Use Topics    Alcohol use: Yes     Comment: 3-4/week      Wt Readings from Last 1 Encounters:   24 81.2 kg (179 lb)        Anesthesia Evaluation   Pt has had prior anesthetic.         ROS/MED HX  ENT/Pulmonary:     (+) sleep apnea,                                       Neurologic:       Cardiovascular:     (+)  hypertension- -  CAD -  - -                                      METS/Exercise Tolerance:     Hematologic:       Musculoskeletal:       GI/Hepatic:       Renal/Genitourinary:     (+) renal disease,             Endo:     (+) type I DM,                     Psychiatric/Substance Use:       Infectious Disease:       Malignancy:       Other:            Physical Exam    Airway        Mallampati: II    Neck ROM: full     Respiratory Devices and Support         Dental       (+) Minor Abnormalities - some fillings, tiny chips      Cardiovascular   cardiovascular exam normal          Pulmonary   pulmonary exam normal       "          OUTSIDE LABS:  CBC:   Lab Results   Component Value Date    WBC 10.8 02/04/2024    WBC 12.5 (H) 02/03/2024    HGB 10.3 (L) 02/04/2024    HGB 10.7 (L) 02/03/2024    HCT 32.8 (L) 02/04/2024    HCT 32.9 (L) 02/03/2024     (L) 02/04/2024     02/03/2024     BMP:   Lab Results   Component Value Date     02/06/2024     02/05/2024    POTASSIUM 4.4 02/06/2024    POTASSIUM 4.9 02/05/2024    CHLORIDE 110 (H) 02/06/2024    CHLORIDE 109 (H) 02/05/2024    CO2 21 (L) 02/06/2024    CO2 19 (L) 02/05/2024    BUN 28.4 (H) 02/06/2024    BUN 38.3 (H) 02/05/2024    CR 1.60 (H) 02/06/2024    CR 2.35 (H) 02/05/2024     (H) 02/06/2024     (H) 02/06/2024     COAGS: No results found for: \"PTT\", \"INR\", \"FIBR\"  POC: No results found for: \"BGM\", \"HCG\", \"HCGS\"  HEPATIC:   Lab Results   Component Value Date    ALBUMIN 3.5 02/03/2024    PROTTOTAL 6.4 02/03/2024    ALT 26 02/03/2024    AST 18 02/03/2024    ALKPHOS 25 (L) 02/03/2024    BILITOTAL 1.1 02/03/2024     OTHER:   Lab Results   Component Value Date    LACT 0.9 02/04/2024    A1C 7.6 (H) 02/03/2024    ORTEGA 9.1 02/06/2024    CRP 0.2 05/13/2023       Anesthesia Plan    ASA Status:  3       Anesthesia Type: General.     - Airway: LMA              Consents    Anesthesia Plan(s) and associated risks, benefits, and realistic alternatives discussed. Questions answered and patient/representative(s) expressed understanding.     - Discussed:     - Discussed with:  Patient      - Extended Intubation/Ventilatory Support Discussed: No.      - Patient is DNR/DNI Status: No     Use of blood products discussed: No .     Postoperative Care    Pain management: Multi-modal analgesia.   PONV prophylaxis: Ondansetron (or other 5HT-3), Dexamethasone or Solumedrol     Comments:               Ellie Bonilla MD    I have reviewed the pertinent notes and labs in the chart from the past 30 days and (re)examined the patient.  Any updates or changes from those notes are " "reflected in this note.      # Hypercalcemia: Highest Ca = 10.2 mg/dL in last 30 days, will monitor as appropriate         # DMII: A1C = 7.6 % (Ref range: <5.7 %) within past 6 months  # Overweight: Estimated body mass index is 27.62 kg/m  as calculated from the following:    Height as of this encounter: 1.715 m (5' 7.5\").    Weight as of this encounter: 81.2 kg (179 lb).      "

## 2024-02-22 LAB
APPEARANCE STONE: NORMAL
COMPN STONE: NORMAL
SPECIMEN WT: 58 MG

## 2024-03-10 ENCOUNTER — HEALTH MAINTENANCE LETTER (OUTPATIENT)
Age: 79
End: 2024-03-10

## 2024-04-23 ENCOUNTER — TELEPHONE (OUTPATIENT)
Dept: UROLOGY | Facility: CLINIC | Age: 79
End: 2024-04-23
Payer: MEDICARE

## 2024-05-14 ENCOUNTER — TELEPHONE (OUTPATIENT)
Dept: UROLOGY | Facility: CLINIC | Age: 79
End: 2024-05-14
Payer: MEDICARE

## 2024-05-14 NOTE — TELEPHONE ENCOUNTER
Spoke with Sharon (spouse) about the [employer] kits. She told me that they did not receive them. I will re fax the order. Navin will call when he has completed them

## 2024-05-19 ENCOUNTER — HEALTH MAINTENANCE LETTER (OUTPATIENT)
Age: 79
End: 2024-05-19

## 2024-05-28 NOTE — TELEPHONE ENCOUNTER
"[facility] Call Center    Phone Message    May a detailed message be left on voicemail: yes     Reason for Call: Other: Pt wife would like a call back to discuss  kits- they did receive them but stated they \"work with their own urologist though [facility] and aren't sure why we have to work here now\" please call pt wife back to discuss       Action Taken: Message routed to:  Other: uro    Travel Screening: Not Applicable                                                                   "

## 2024-05-29 ENCOUNTER — TELEPHONE (OUTPATIENT)
Dept: UROLOGY | Facility: CLINIC | Age: 79
End: 2024-05-29
Payer: MEDICARE

## 2024-05-29 NOTE — TELEPHONE ENCOUNTER
RETURNED A CALL TO THE PT'S WIFE RE:DONATO SIMS. SHE SAID THAT THEY WORK WITH A UROLOGIST THROUGH [facility]. LM TO CALL ME BACK TO CONFIRM THIS.

## 2024-05-30 NOTE — TELEPHONE ENCOUNTER
Clinic has reached out to the spouse and left a message for a call back on 05/29/2024.  Per the notes and chart documentation they work with an outside urologist.  The request for 24 hr urine collection is related to post surgical protocol.    BRITTNEY Rosenberg  Care Coordinator Urology  [phone]     Pt transferred to [id number], RN at bedside.

## 2024-06-03 ENCOUNTER — TELEPHONE (OUTPATIENT)
Dept: UROLOGY | Facility: CLINIC | Age: 79
End: 2024-06-03
Payer: MEDICARE

## 2024-06-03 NOTE — TELEPHONE ENCOUNTER
[facility] Call Center    Phone Message    May a detailed message be left on voicemail: yes     Reason for Call: Other: Pt was seen by Dr. Brumfield for surgery. Pt wife is informing care team that he is going to be following up with his normal urologist      Action Taken: Other: uro    Travel Screening: Not Applicable     Date of Service:

## 2024-10-06 ENCOUNTER — HEALTH MAINTENANCE LETTER (OUTPATIENT)
Age: 79
End: 2024-10-06

## 2025-01-19 ENCOUNTER — HEALTH MAINTENANCE LETTER (OUTPATIENT)
Age: 80
End: 2025-01-19

## 2025-03-16 ENCOUNTER — HEALTH MAINTENANCE LETTER (OUTPATIENT)
Age: 80
End: 2025-03-16

## 2025-04-27 ENCOUNTER — HEALTH MAINTENANCE LETTER (OUTPATIENT)
Age: 80
End: 2025-04-27

## 2025-08-10 ENCOUNTER — HEALTH MAINTENANCE LETTER (OUTPATIENT)
Age: 80
End: 2025-08-10

## (undated) DEVICE — GOWN XLG DISP 9545

## (undated) DEVICE — TUBING SUCTION MEDI-VAC 1/4"X20' N620A

## (undated) DEVICE — KIT ENDO FIRST STEP DISINFECTANT 200ML W/POUCH EP-4

## (undated) DEVICE — MAT FLOOR SURGICAL 40X38 0702140238

## (undated) DEVICE — PREP DYNA-HEX 4% CHG SCRUB 4OZ BOTTLE MDS098710

## (undated) DEVICE — GLOVE BIOGEL PI ULTRATOUCH G SZ 8.0 42180

## (undated) DEVICE — GLOVE BIOGEL PI INDICATOR 8.0 LF 41680

## (undated) DEVICE — WIRE GLIDE 0.035"X150CM VASC GR3506

## (undated) DEVICE — SUCTION MANIFOLD NEPTUNE 2 SYS 1 PORT 702-025-000

## (undated) DEVICE — SOL WATER IRRIG 1000ML BOTTLE 2F7114

## (undated) DEVICE — CUSTOM PACK CYSTO PREFERRED SOT5BCYHEA

## (undated) DEVICE — JUMPSUIT CYSTO DISP SD-100

## (undated) DEVICE — GUIDEWIRE BENTSON FLEX TIP 0.035"X150CM M0066201250

## (undated) DEVICE — DRAPE UNDER BUTTOCK 89415

## (undated) DEVICE — SOLUTION IRRIG 2B7127 .9NS 3000ML BAG

## (undated) DEVICE — TUBING SET THERMEDX UROLOGY SGL USE LL0006

## (undated) RX ORDER — LIDOCAINE HYDROCHLORIDE 10 MG/ML
INJECTION, SOLUTION EPIDURAL; INFILTRATION; INTRACAUDAL; PERINEURAL
Status: DISPENSED
Start: 2024-02-04

## (undated) RX ORDER — PROPOFOL 10 MG/ML
INJECTION, EMULSION INTRAVENOUS
Status: DISPENSED
Start: 2024-02-04